# Patient Record
Sex: MALE | Race: WHITE | NOT HISPANIC OR LATINO | Employment: FULL TIME | URBAN - METROPOLITAN AREA
[De-identification: names, ages, dates, MRNs, and addresses within clinical notes are randomized per-mention and may not be internally consistent; named-entity substitution may affect disease eponyms.]

---

## 2017-01-04 ENCOUNTER — OFFICE VISIT (OUTPATIENT)
Dept: FAMILY MEDICINE CLINIC | Facility: CLINIC | Age: 22
End: 2017-01-04

## 2017-01-04 VITALS
WEIGHT: 163 LBS | DIASTOLIC BLOOD PRESSURE: 82 MMHG | HEART RATE: 97 BPM | SYSTOLIC BLOOD PRESSURE: 118 MMHG | HEIGHT: 71 IN | BODY MASS INDEX: 22.82 KG/M2 | OXYGEN SATURATION: 98 % | TEMPERATURE: 97.9 F

## 2017-01-04 DIAGNOSIS — L72.8 OTHER FOLLICULAR CYSTS OF THE SKIN AND SUBCUTANEOUS TISSUE: ICD-10-CM

## 2017-01-04 DIAGNOSIS — F17.210 CIGARETTE SMOKER: Primary | ICD-10-CM

## 2017-01-04 PROBLEM — M16.9 DEGENERATIVE ARTHRITIS OF HIP: Status: ACTIVE | Noted: 2017-01-04

## 2017-01-04 PROBLEM — R11.0 CHRONIC NAUSEA: Status: ACTIVE | Noted: 2017-01-04

## 2017-01-04 PROBLEM — L73.9 FOLLICULITIS: Status: ACTIVE | Noted: 2017-01-04

## 2017-01-04 PROBLEM — F07.81 BRAIN SYNDROME, POSTTRAUMATIC: Status: ACTIVE | Noted: 2017-01-04

## 2017-01-04 PROBLEM — K59.00 CN (CONSTIPATION): Status: ACTIVE | Noted: 2017-01-04

## 2017-01-04 PROBLEM — R59.9 ADENOPATHY: Status: ACTIVE | Noted: 2017-01-04

## 2017-01-04 PROBLEM — S06.0XAA CONCUSSION: Status: ACTIVE | Noted: 2017-01-04

## 2017-01-04 PROBLEM — F41.9 ANXIETY: Status: ACTIVE | Noted: 2017-01-04

## 2017-01-04 PROBLEM — J30.9 ALLERGIC RHINITIS: Status: ACTIVE | Noted: 2017-01-04

## 2017-01-04 PROBLEM — R00.2 AWARENESS OF HEARTBEATS: Status: ACTIVE | Noted: 2017-01-04

## 2017-01-04 PROBLEM — R10.9 ABDOMINAL PAIN: Status: ACTIVE | Noted: 2017-01-04

## 2017-01-04 PROBLEM — G89.21 CHRONIC PAIN DUE TO INJURY: Status: ACTIVE | Noted: 2017-01-04

## 2017-01-04 PROCEDURE — 99204 OFFICE O/P NEW MOD 45 MIN: CPT | Performed by: FAMILY MEDICINE

## 2017-01-04 RX ORDER — TRAZODONE HYDROCHLORIDE 50 MG/1
1-2 TABLET ORAL NIGHTLY
Refills: 0 | COMMUNITY
Start: 2016-12-14 | End: 2017-06-19

## 2017-01-04 RX ORDER — LANSOPRAZOLE 30 MG/1
1 CAPSULE, DELAYED RELEASE ORAL DAILY
Refills: 0 | COMMUNITY
Start: 2016-11-19 | End: 2017-06-19

## 2017-01-04 RX ORDER — DEXTROAMPHETAMINE SACCHARATE, AMPHETAMINE ASPARTATE, DEXTROAMPHETAMINE SULFATE AND AMPHETAMINE SULFATE 2.5; 2.5; 2.5; 2.5 MG/1; MG/1; MG/1; MG/1
20 TABLET ORAL 2 TIMES DAILY
Refills: 0 | COMMUNITY
Start: 2016-12-12 | End: 2017-06-20 | Stop reason: HOSPADM

## 2017-01-04 RX ORDER — HYDROCODONE BITARTRATE AND ACETAMINOPHEN 5; 325 MG/1; MG/1
1 TABLET ORAL 2 TIMES DAILY
Refills: 0 | COMMUNITY
Start: 2016-12-30 | End: 2017-06-19

## 2017-01-04 RX ORDER — VARENICLINE TARTRATE 1 MG/1
1 TABLET, FILM COATED ORAL 2 TIMES DAILY
Qty: 60 TABLET | Refills: 2 | OUTPATIENT
Start: 2017-01-04 | End: 2017-04-29

## 2017-01-04 NOTE — PROGRESS NOTES
Subjective   Mauro Epstein II is a 21 y.o. male who is here for   Chief Complaint   Patient presents with   • Establish Care     New pt here to [facility] for health maintenance. Formerly seen Dr. Holland with [facility]   • COPD     Went to [facility] due to cough and they informed him he had COPD. Wants to discuss.   • Bronchitis   • Rash   • Cough   .     History of Present Illness   Acute Bronchitis: Patient presents for presents evaluation of productive cough. Symptoms began several months ago and are gradually improving since that time.  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 s/w dr holland's office, pt was discharged due to no shows    Sees pain management for left hip post fracture arthritis pain.    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    Has cysts on his penis, checked for std multi times all neg,     Wants to quit smoking        The following portions of the patient's history were reviewed and updated as appropriate: allergies, current medications, past family history, past medical history, past social history, past surgical history and problem list.    Review of Systems    Objective   Physical Exam   Constitutional: He appears well-developed and well-nourished. He has a sickly appearance.   Appears tired or even hung over   HENT:   Mouth/Throat: Oropharynx is clear and moist.   Eyes: Right conjunctiva is injected. Left conjunctiva is injected.   Neck: Normal range of motion.   Cardiovascular: Normal rate.    Pulmonary/Chest: Effort normal.   Genitourinary: Circumcised. No penile tenderness.   Genitourinary Comments: Has 5 or so skin cysts on penile shaft   Neurological: He is alert.   Psychiatric: He has a normal mood and affect.   Nursing note and vitals reviewed.      Assessment/Plan   Mauro Chase was seen today for establish care, copd,  bronchitis, rash and cough.    Diagnoses and all orders for this visit:    Cigarette smoker  -     varenicline (CHANTIX STARTING MONTH PAK) 0.5 MG X 11 & 1 MG X 42 tablet; Take 0.5 mg one daily on days 1-2 and 0.5 mg twice daily on days 4-7. Then 1 mg twice daily for a total of 12 weeks.  -     varenicline (CHANTIX CONTINUING MONTH PAK) 1 MG tablet; Take 1 tablet by mouth 2 (Two) Times a Day.    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 To skin cysts  -     Ambulatory Referral to Plastic Surgery      There are no Patient Instructions on file for this visit.    Medications Discontinued During This Encounter   Medication Reason   • Ascorbic Acid (VITAMIN C PO) Therapy completed   • ondansetron (ZOFRAN) 4 MG tablet Therapy completed   • ondansetron (ZOFRAN) 4 MG tablet Therapy completed   • aspirin 325 MG tablet Therapy completed   • ALPRAZolam (XANAX) 0.25 MG tablet Therapy completed   • oxyCODONE-acetaminophen (PERCOCET) 5-325 MG per tablet Therapy completed        Return if symptoms worsen or fail to improve.    Dr. Gideon Foley  [place], [place]

## 2017-01-04 NOTE — MR AVS SNAPSHOT
Mauro Chase Amtala OMALLEY   1/4/2017 1:00 PM   Office Visit    Provider:  Gideon Foley MD   Department:  [facility]   Dept Phone:  [phone]                Your Full Care Plan              Today's Medication Changes          These changes are accurate as of: 1/4/17  1:50 PM.  If you have any questions, ask your nurse or doctor.               New Medication(s)Ordered:     * varenicline 0.5 MG X 11 & 1 MG X 42 tablet   Commonly known as:  CHANTIX STARTING MONTH TERRENCE   Take 0.5 mg one daily on days 1-2 and 0.5 mg twice daily on days 4-7. Then 1 mg twice daily for a total of 12 weeks.   Started by:  Gideon Foley MD       * varenicline 1 MG tablet   Commonly known as:  CHANTIX CONTINUING MONTH TERRENCE   Take 1 tablet by mouth 2 (Two) Times a Day.   Started by:  Gideon Foley MD       * Notice:  This list has 2 medication(s) that are the same as other medications prescribed for you. Read the directions carefully, and ask your doctor or other care provider to review them with you.      Stop taking medication(s)listed here:     ALPRAZolam 0.25 MG tablet   Commonly known as:  XANAX   Stopped by:  Gideon Foley MD           aspirin 325 MG tablet   Stopped by:  Gideon Foley MD           ondansetron 4 MG tablet   Commonly known as:  ZOFRAN   Stopped by:  Gideon Foley MD           PERCOCET 5-325 MG per tablet   Generic drug:  oxyCODONE-acetaminophen   Stopped by:  Gideon Foley MD           VITAMIN C PO   Stopped by:  Gideon Foley MD           ZOFRAN 4 MG tablet   Generic drug:  ondansetron   Stopped by:  Gideon Foley MD                Where to Get Your Medications      These medications were sent to [facility] [address] [place] [address] [address] [address] [address] - [phone] [place] [phone] [address] [address]     Phone:  [phone]     varenicline 0.5 MG X 11 & 1  MG X 42 tablet    varenicline 1 MG tablet                  Your Updated Medication List          This list is accurate as of: 17  1:50 PM.  Always use your most recent med list.                amphetamine-dextroamphetamine 10 MG tablet   Commonly known as:  ADDERALL       HYDROcodone-acetaminophen 5-325 MG per tablet   Commonly known as:  NORCO       lansoprazole 30 MG capsule   Commonly known as:  PREVACID       MULTIPLE VITAMINS PO       traZODone 50 MG tablet   Commonly known as:  DESYREL       * varenicline 0.5 MG X 11 & 1 MG X 42 tablet   Commonly known as:  CHANTIX STARTING MONTH TERRENCE   Take 0.5 mg one daily on days 1-2 and 0.5 mg twice daily on days 4-7. Then 1 mg twice daily for a total of 12 weeks.       * varenicline 1 MG tablet   Commonly known as:  CHANTIX CONTINUING MONTH TERRENCE   Take 1 tablet by mouth 2 (Two) Times a Day.       * Notice:  This list has 2 medication(s) that are the same as other medications prescribed for you. Read the directions carefully, and ask your doctor or other care provider to review them with you.            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 To sign up, go to [employer] and click on the Sign Up Now link in the New User? box. Enter your [email or web address] Activation Code exactly as it appears below along with the last four digits of your Social Security Number and your Date of Birth () to complete the sign-up process. If you do not sign up before the expiration date, you must request a new code.    [email or web address] Activation Code: [email or web address]  Expires: 2017   "1:50 PM    If you have questions, you can email [email or web address] or call [phone] to talk to our MyChart staff. Remember, [facility] is NOT to be used for urgent needs. For medical emergencies, dial 911.               Other Info from Your Visit           Allergies     Amoxicillin-pot Clavulanate      Cefaclor      Levofloxacin  Itching, Rash      Reason for Visit     Establish Care New pt here to [facility] care for health maintenance. Formerly seen Dr. Holland with [facility]    COPD Went to [facility] due to cough and they informed him he had COPD. Wants to discuss.      Vital Signs     Blood Pressure Pulse Temperature Height Weight Oxygen Saturation    118/82 97 97.9 °F (36.6 °C) 71\" (180.3 cm) 163 lb (73.9 kg) 98%    Body Mass Index Smoking Status                22.73 kg/m2 Current Every Day Smoker          Problems and Diagnoses Noted     Abdominal pain    Adenopathy    Nasal inflammation due to allergen    Anxiety problem    Hip pain    Awareness of heartbeats    Brain syndrome, posttraumatic    Chronic nausea    Chronic pain due to injury    Cigarette smoker    Constipation    Complications due to internal orthopedic device, implant, and graft    Concussion    Osteoarthritis (arthritis due to wear and tear of joints)    Folliculitis    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

## 2017-01-06 DIAGNOSIS — L72.8 OTHER FOLLICULAR CYSTS OF THE SKIN AND SUBCUTANEOUS TISSUE: Primary | ICD-10-CM

## 2017-04-13 ENCOUNTER — HOSPITAL ENCOUNTER (EMERGENCY)
Facility: HOSPITAL | Age: 22
Discharge: HOME | End: 2017-04-13
Attending: EMERGENCY MEDICINE | Admitting: EMERGENCY MEDICINE

## 2017-04-13 ENCOUNTER — APPOINTMENT (OUTPATIENT)
Dept: GENERAL RADIOLOGY | Facility: HOSPITAL | Age: 22
End: 2017-04-13

## 2017-04-13 VITALS
WEIGHT: 155 LBS | HEIGHT: 71 IN | RESPIRATION RATE: 14 BRPM | BODY MASS INDEX: 21.7 KG/M2 | SYSTOLIC BLOOD PRESSURE: 123 MMHG | OXYGEN SATURATION: 99 % | DIASTOLIC BLOOD PRESSURE: 85 MMHG | HEART RATE: 118 BPM | TEMPERATURE: 99.4 F

## 2017-04-13 DIAGNOSIS — S39.012A LUMBAR STRAIN, INITIAL ENCOUNTER: Primary | ICD-10-CM

## 2017-04-13 DIAGNOSIS — M54.16 LUMBAR RADICULOPATHY, ACUTE: ICD-10-CM

## 2017-04-13 DIAGNOSIS — M62.838 MUSCLE SPASM: ICD-10-CM

## 2017-04-13 PROCEDURE — 96372 THER/PROPH/DIAG INJ SC/IM: CPT

## 2017-04-13 PROCEDURE — 99282 EMERGENCY DEPT VISIT SF MDM: CPT

## 2017-04-13 PROCEDURE — 99284 EMERGENCY DEPT VISIT MOD MDM: CPT | Performed by: EMERGENCY MEDICINE

## 2017-04-13 PROCEDURE — 72100 X-RAY EXAM L-S SPINE 2/3 VWS: CPT

## 2017-04-13 PROCEDURE — 25010000002 HYDROMORPHONE PER 4 MG: Performed by: EMERGENCY MEDICINE

## 2017-04-13 RX ORDER — LORAZEPAM 0.5 MG/1
2 TABLET ORAL 2 TIMES DAILY
COMMUNITY
End: 2017-06-20 | Stop reason: HOSPADM

## 2017-04-13 RX ORDER — CYCLOBENZAPRINE HCL 10 MG
TABLET ORAL
Qty: 20 TABLET | Refills: 0 | Status: SHIPPED | OUTPATIENT
Start: 2017-04-13 | End: 2017-06-19

## 2017-04-13 RX ADMIN — HYDROMORPHONE HYDROCHLORIDE 1 MG: 1 INJECTION, SOLUTION INTRAMUSCULAR; INTRAVENOUS; SUBCUTANEOUS at 01:46

## 2017-04-13 NOTE — ED PROVIDER NOTES
Subjective   History of Present Illness  History of Present Illness    Chief complaint: Low back pain    Location: low back, bilateral paraspinal    Quality/Severity:  severe    Timing/Duration: abruptly worse since 2230 after a fall    Modifying Factors: worse c ROM and attempts at ambulation    Associated Symptoms: sensation change in bilateral legs and difficulty walking    Narrative: 20 yo male c hx of lumbar back pain for which he follows c pain specialist and has received injections previously presents to the ED following a mechanical fall in which he significantly aggravated his pain.     Review of Systems  No saddle anesthesia, no incontinence. O/W negative    Past Medical History:   Diagnosis Date   • ADD (attention deficit disorder)    • Anxiety    • Arthritis    • Chronic pain    • Headache    • Lumbar back pain    • Tobacco abuse counseling        Allergies   Allergen Reactions   • Amoxicillin-Pot Clavulanate    • Cefaclor    • Levofloxacin Itching and Rash       Past Surgical History:   Procedure Laterality Date   • FEMUR FRACTURE SURGERY  06/2009   • HIP SURGERY Left 10/2014   • HIP SURGERY Left 06/2009       Family History   Problem Relation Age of Onset   • Diabetes Mother    • Fibromyalgia Mother    • Heart disease Mother    • Hypertension Mother    • Cervical cancer Mother    • Hypertension Father    • No Known Problems Sister    • No Known Problems Brother    • No Known Problems Sister    • No Known Problems Sister    • No Known Problems Sister        Social History     Social History   • Marital status: Single     Spouse name: N/A   • Number of children: N/A   • Years of education: N/A     Occupational History   • Student    •       Social History Main Topics   • Smoking status: Current Every Day Smoker     Packs/day: 0.50     Types: Cigarettes   • Smokeless tobacco: Not on file   • Alcohol use Yes      Comment: Social   • Drug use: No   • Sexual activity: Yes     Partners: Male     Other  "Topics Concern   • Not on file     Social History Narrative   • No narrative on file       ED Triage Vitals   Temp Heart Rate Resp BP SpO2   04/13/17 0055 04/13/17 0055 04/13/17 0055 04/13/17 0055 04/13/17 0055   99.4 °F (37.4 °C) 118 14 123/85 99 %      Temp src Heart Rate Source Patient Position BP Location FiO2 (%)   -- -- 04/13/17 0055 04/13/17 0055 --     Lying Left arm      Objective   Physical Exam   Constitutional: He is oriented to person, place, and time. He appears well-developed.   Uncomfortable appearing   Cardiovascular: Normal rate and regular rhythm.    Pulmonary/Chest: Effort normal. No respiratory distress.   Abdominal: Soft. He exhibits no distension. There is no tenderness.   Musculoskeletal:        Back:    Neurological: He is alert and oriented to person, place, and time. He has normal strength.   Reflex Scores:       Patellar reflexes are 2+ on the right side and 2+ on the left side.       Achilles reflexes are 2+ on the right side and 2+ on the left side.  Pt notes sensory change to bilateral feet. \"I can feel but it feels different than before.\"   Skin: Skin is warm and dry.   Psychiatric: Judgment and thought content normal.   Vitals reviewed.      Procedures    RADIOLOGY        Study: XR L-spine    Findings: Lumbar straightening.  No fracture or subluxation    Interpreted Contemporaneously by myself, independently viewed by me           ED Course  ED Course   Comment By Time   Patient was much more comfortable after pain medication.  Reviewed x-ray results with the patient.  He is able to stand at bedside rock to heels and stand to tiptoes as well as stand 1 leg and then the other with normal gait and balance.  Normal strength is noted.  Recommend outpatient follow-up with neurosurgery.  Patient expresses understanding agreement with this plan. Christoph Ruiz MD 04/13 6300                  [facility]    Final diagnoses:   Lumbar strain, initial encounter   Muscle spasm   Lumbar radiculopathy, " acute              Medication List      New Prescriptions          cyclobenzaprine 10 MG tablet   Commonly known as:  FLEXERIL   Take one tablet by mouth up to 3 times daily as needed for muscle spasms                  Christoph Ruiz MD  04/13/17 5345

## 2017-04-14 ENCOUNTER — APPOINTMENT (OUTPATIENT)
Dept: MRI IMAGING | Facility: HOSPITAL | Age: 22
End: 2017-04-14

## 2017-04-14 ENCOUNTER — OFFICE VISIT (OUTPATIENT)
Dept: FAMILY MEDICINE CLINIC | Facility: CLINIC | Age: 22
End: 2017-04-14

## 2017-04-14 ENCOUNTER — HOSPITAL ENCOUNTER (EMERGENCY)
Facility: HOSPITAL | Age: 22
Discharge: HOME | End: 2017-04-14
Attending: EMERGENCY MEDICINE | Admitting: EMERGENCY MEDICINE

## 2017-04-14 VITALS
OXYGEN SATURATION: 99 % | HEIGHT: 71 IN | HEART RATE: 103 BPM | BODY MASS INDEX: 21 KG/M2 | SYSTOLIC BLOOD PRESSURE: 128 MMHG | WEIGHT: 150 LBS | DIASTOLIC BLOOD PRESSURE: 77 MMHG | RESPIRATION RATE: 16 BRPM | TEMPERATURE: 98.3 F

## 2017-04-14 VITALS
OXYGEN SATURATION: 98 % | DIASTOLIC BLOOD PRESSURE: 60 MMHG | HEIGHT: 71 IN | WEIGHT: 148 LBS | HEART RATE: 121 BPM | SYSTOLIC BLOOD PRESSURE: 100 MMHG | TEMPERATURE: 98.1 F | BODY MASS INDEX: 20.72 KG/M2

## 2017-04-14 DIAGNOSIS — M54.41 CHRONIC MIDLINE LOW BACK PAIN WITH BILATERAL SCIATICA: ICD-10-CM

## 2017-04-14 DIAGNOSIS — M54.42 CHRONIC MIDLINE LOW BACK PAIN WITH BILATERAL SCIATICA: ICD-10-CM

## 2017-04-14 DIAGNOSIS — M54.5 CHRONIC LOW BACK PAIN, UNSPECIFIED BACK PAIN LATERALITY, WITH SCIATICA PRESENCE UNSPECIFIED: Primary | ICD-10-CM

## 2017-04-14 DIAGNOSIS — G89.29 CHRONIC MIDLINE LOW BACK PAIN WITH BILATERAL SCIATICA: ICD-10-CM

## 2017-04-14 DIAGNOSIS — G89.29 CHRONIC LOW BACK PAIN, UNSPECIFIED BACK PAIN LATERALITY, WITH SCIATICA PRESENCE UNSPECIFIED: Primary | ICD-10-CM

## 2017-04-14 DIAGNOSIS — F17.210 CIGARETTE SMOKER: ICD-10-CM

## 2017-04-14 DIAGNOSIS — M51.36 DDD (DEGENERATIVE DISC DISEASE), LUMBAR: Primary | ICD-10-CM

## 2017-04-14 PROBLEM — M51.369 DDD (DEGENERATIVE DISC DISEASE), LUMBAR: Status: ACTIVE | Noted: 2017-04-14

## 2017-04-14 PROCEDURE — 99283 EMERGENCY DEPT VISIT LOW MDM: CPT

## 2017-04-14 PROCEDURE — 99284 EMERGENCY DEPT VISIT MOD MDM: CPT | Performed by: EMERGENCY MEDICINE

## 2017-04-14 PROCEDURE — 25010000002 ONDANSETRON PER 1 MG: Performed by: EMERGENCY MEDICINE

## 2017-04-14 PROCEDURE — 99213 OFFICE O/P EST LOW 20 MIN: CPT | Performed by: FAMILY MEDICINE

## 2017-04-14 PROCEDURE — 72148 MRI LUMBAR SPINE W/O DYE: CPT

## 2017-04-14 PROCEDURE — 96375 TX/PRO/DX INJ NEW DRUG ADDON: CPT

## 2017-04-14 PROCEDURE — 96374 THER/PROPH/DIAG INJ IV PUSH: CPT

## 2017-04-14 PROCEDURE — 25010000002 HYDROMORPHONE PER 4 MG: Performed by: EMERGENCY MEDICINE

## 2017-04-14 PROCEDURE — 96376 TX/PRO/DX INJ SAME DRUG ADON: CPT

## 2017-04-14 PROCEDURE — 25010000002 LORAZEPAM PER 2 MG: Performed by: EMERGENCY MEDICINE

## 2017-04-14 RX ORDER — BACLOFEN 20 MG/1
20 TABLET ORAL 3 TIMES DAILY PRN
Qty: 60 TABLET | Refills: 5 | Status: SHIPPED | OUTPATIENT
Start: 2017-04-14 | End: 2017-06-19

## 2017-04-14 RX ORDER — PROMETHAZINE HYDROCHLORIDE 25 MG/ML
25 INJECTION, SOLUTION INTRAMUSCULAR; INTRAVENOUS ONCE
Status: DISCONTINUED | OUTPATIENT
Start: 2017-04-14 | End: 2017-04-14

## 2017-04-14 RX ORDER — GABAPENTIN 800 MG/1
800 TABLET ORAL NIGHTLY PRN
Qty: 30 TABLET | Refills: 1 | Status: SHIPPED | OUTPATIENT
Start: 2017-04-14 | End: 2017-06-19

## 2017-04-14 RX ORDER — ONDANSETRON 2 MG/ML
4 INJECTION INTRAMUSCULAR; INTRAVENOUS ONCE
Status: COMPLETED | OUTPATIENT
Start: 2017-04-14 | End: 2017-04-14

## 2017-04-14 RX ORDER — LORAZEPAM 2 MG/ML
1 INJECTION INTRAMUSCULAR ONCE
Status: COMPLETED | OUTPATIENT
Start: 2017-04-14 | End: 2017-04-14

## 2017-04-14 RX ORDER — SODIUM CHLORIDE 0.9 % (FLUSH) 0.9 %
10 SYRINGE (ML) INJECTION AS NEEDED
Status: DISCONTINUED | OUTPATIENT
Start: 2017-04-14 | End: 2017-04-14 | Stop reason: HOSPADM

## 2017-04-14 RX ADMIN — HYDROMORPHONE HYDROCHLORIDE 1 MG: 1 INJECTION, SOLUTION INTRAMUSCULAR; INTRAVENOUS; SUBCUTANEOUS at 15:30

## 2017-04-14 RX ADMIN — LORAZEPAM 1 MG: 2 INJECTION INTRAMUSCULAR; INTRAVENOUS at 15:32

## 2017-04-14 RX ADMIN — HYDROMORPHONE HYDROCHLORIDE 1 MG: 1 INJECTION, SOLUTION INTRAMUSCULAR; INTRAVENOUS; SUBCUTANEOUS at 14:45

## 2017-04-14 RX ADMIN — ONDANSETRON 4 MG: 2 INJECTION, SOLUTION INTRAMUSCULAR; INTRAVENOUS at 14:45
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
## 2017-04-14 NOTE — ED NOTES
The patient is refusing to be discharged.  He is crying and expressing anger towards the ER for not being able to help him with his pain.  He wants to see a higher qualified ER MD.  Informed him that Dr Cyr is gone for the day.  He is on the telephone calling a family friend who is an  downtown.  I informed my director Lucrecia Michaels that the patient wishes to speak to someone higher than me.       Citlaly Mckinnon RN  04/14/17 6926

## 2017-04-14 NOTE — ED NOTES
There is an MRI ordered under Dr Foley from yesterday to do an MRI lumbar spine without contrast as an outpatient.  Dr Cyr wants to have the patient medicated and sent to MRI for the scan.  Spoke with MRI department and was informed that we can't do an outpatient test while the patient is in the ER.  She states that the order needs to be placed under the ER account number.       Citlaly Mckinnon RN  04/14/17 0216
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
## 2017-04-14 NOTE — ED NOTES
The patient verbalized understanding of discharge instructions, medications and follow up.  Ambulated from the ER with family.  No further needs at this time.       Citlaly Mckinnon RN  04/14/17 5766       Citlaly Mckinnon RN  04/14/17 2623

## 2017-04-15 NOTE — PROGRESS NOTES
Chief Complaint   Patient presents with   • Cough     x 2 days   • Back Pain       Low Back Pain: Patient complains of chronic low back pain. This is evaluated as a non injury. The patient first noted symptoms severalweeks ago. It was not related to no known injury. The pain is rated severe, intense, unremitting, and is located at the across the lower back. The pain is described as aching and occurs all day. The symptoms has been progressive. Symptoms are exacerbated by nothing in particular. Factors which relieve the pain include narcotic pain medications. Other associated symptoms include tingling in the right leg and tingling in the left leg. Previous history of symptoms: the problem is long-standing.   Treatment efforts have included rest, ice, OTC NSAIDS, prescription NSAIDS, acetaminophen, oral steroids and muscle relaxers, without relief.    Has chronic pain in hip from old fracture and removal of hardware  Sees pain management for years  receives injections and narcotics  He states this pain is worse and more in his lumbar vs hip/pelvis      He went to the ER a few days ago  We reviewed that note and looked at his xray's  Despite loss of lordosis his vertebrae and disc spaces look great.    No GI or  symptoms    Also lots of cough  Is a heavy smoker  Each cough makes his back hurt more      Objective   General:  Alert , seems to be in  distress  HEENT:  WNL  Heart: RR , no murmur  Vascular: good pulses in legs/feet  Lungs: clear  Back: mild decrease in ROM.    Neuro: Gait is normal, reflexes normal.  Skin: no rash.    INDICATION: Low back pain. Increasing severity.      FINDINGS: 3 views of the lumbar spine without comparison. No acute  fracture or subluxation. Vertebral body height is normal. There is mild  straightening of the normal thoracic lordosis. The sacroiliac joints are  normal.      IMPRESSION:  No acute findings.    Assessment/Plan   Mauro Chase was seen today for cough and back  pain.    Diagnoses and all orders for this visit:    DDD (degenerative disc disease), lumbar  -     MRI Lumbar Spine Without Contrast; Future  -     baclofen (LIORESAL) 20 MG tablet; Take 1 tablet by mouth 3 (Three) Times a Day As Needed for Muscle Spasms.  -     gabapentin (NEURONTIN) 800 MG tablet; Take 1 tablet by mouth At Night As Needed (pain).    Chronic midline low back pain with bilateral sciatica  -     gabapentin (NEURONTIN) 800 MG tablet; Take 1 tablet by mouth At Night As Needed (pain).    Cigarette smoker  -     PSE-Bromphen-Chlophedianol 30-2-12.5 MG/5ML liquid; Take 5 mL by mouth 4 (Four) Times a Day As Needed (cough).              There are no discontinued medications.     Patient Instructions   Will proceed on to lumbar MRI  Add gabapentin on qhs for pain and sleep    Cough medications.        We reviewed home treatments for back pain  No heavy lifting  Nightly back stretches  OTC medications: Tylenol, Advil, Aleve, IcyHot Patches  Hot soaks in tub.    Dr. Gideon Foley MD  [place], [place]  [facility].

## 2017-04-25 ENCOUNTER — TRANSCRIBE ORDERS (OUTPATIENT)
Dept: ADMINISTRATIVE | Facility: HOSPITAL | Age: 22
End: 2017-04-25

## 2017-04-25 DIAGNOSIS — Z13.9 SCREENING: Primary | ICD-10-CM

## 2017-04-28 ENCOUNTER — HOSPITAL ENCOUNTER (OUTPATIENT)
Dept: CARDIOLOGY | Facility: HOSPITAL | Age: 22
Discharge: HOME | End: 2017-04-28

## 2017-04-28 VITALS
BODY MASS INDEX: 21 KG/M2 | HEIGHT: 71 IN | WEIGHT: 150 LBS | HEART RATE: 98 BPM | SYSTOLIC BLOOD PRESSURE: 116 MMHG | DIASTOLIC BLOOD PRESSURE: 67 MMHG | OXYGEN SATURATION: 99 % | RESPIRATION RATE: 18 BRPM

## 2017-04-28 DIAGNOSIS — Z13.9 SCREENING: ICD-10-CM

## 2017-04-28 LAB
BH CV STRESS BP STAGE 1: NORMAL
BH CV STRESS BP STAGE 2: NORMAL
BH CV STRESS BP STAGE 3: NORMAL
BH CV STRESS DURATION MIN STAGE 1: 3
BH CV STRESS DURATION MIN STAGE 2: 3
BH CV STRESS DURATION MIN STAGE 3: 3
BH CV STRESS DURATION SEC STAGE 1: 0
BH CV STRESS DURATION SEC STAGE 2: 0
BH CV STRESS DURATION SEC STAGE 3: 0
BH CV STRESS GRADE STAGE 1: 10
BH CV STRESS GRADE STAGE 2: 12
BH CV STRESS GRADE STAGE 3: 14
BH CV STRESS HR STAGE 1: 125
BH CV STRESS HR STAGE 2: 150
BH CV STRESS HR STAGE 3: 171
BH CV STRESS METS STAGE 1: 5
BH CV STRESS METS STAGE 2: 7.5
BH CV STRESS METS STAGE 3: 10
BH CV STRESS PROTOCOL 1: NORMAL
BH CV STRESS RECOVERY BP: NORMAL MMHG
BH CV STRESS RECOVERY HR: 97 BPM
BH CV STRESS SPEED STAGE 1: 1.7
BH CV STRESS SPEED STAGE 2: 2.5
BH CV STRESS SPEED STAGE 3: 3.4
BH CV STRESS STAGE 1: 1
BH CV STRESS STAGE 2: 2
BH CV STRESS STAGE 3: 3
MAXIMAL PREDICTED HEART RATE: 197 BPM
PERCENT MAX PREDICTED HR: 86.8 %
STRESS BASELINE BP: NORMAL MMHG
STRESS BASELINE HR: 98 BPM
STRESS O2 SAT REST: 99 %
STRESS PERCENT HR: 102 %
STRESS POST ESTIMATED WORKLOAD: 10.2 METS
STRESS POST EXERCISE DUR MIN: 9 MIN
STRESS POST EXERCISE DUR SEC: 1 SEC
STRESS POST PEAK BP: NORMAL MMHG
STRESS POST PEAK HR: 171 BPM
STRESS TARGET HR: 167 BPM

## 2017-04-28 PROCEDURE — 93018 CV STRESS TEST I&R ONLY: CPT | Performed by: INTERNAL MEDICINE

## 2017-04-28 PROCEDURE — 93016 CV STRESS TEST SUPVJ ONLY: CPT | Performed by: INTERNAL MEDICINE

## 2017-04-28 PROCEDURE — 93017 CV STRESS TEST TRACING ONLY: CPT

## 2017-05-05 ENCOUNTER — TELEPHONE (OUTPATIENT)
Dept: NEUROSURGERY | Facility: CLINIC | Age: 22
End: 2017-05-05

## 2017-06-19 ENCOUNTER — HOSPITAL ENCOUNTER (OUTPATIENT)
Facility: HOSPITAL | Age: 22
Setting detail: OBSERVATION
Discharge: HOME | End: 2017-06-20
Attending: EMERGENCY MEDICINE | Admitting: HOSPITALIST

## 2017-06-19 ENCOUNTER — APPOINTMENT (OUTPATIENT)
Dept: CT IMAGING | Facility: HOSPITAL | Age: 22
End: 2017-06-19

## 2017-06-19 DIAGNOSIS — R10.31 RIGHT LOWER QUADRANT ABDOMINAL PAIN: Primary | ICD-10-CM

## 2017-06-19 DIAGNOSIS — R11.2 NON-INTRACTABLE VOMITING WITH NAUSEA, UNSPECIFIED VOMITING TYPE: ICD-10-CM

## 2017-06-19 LAB
ALBUMIN SERPL-MCNC: 4.7 G/DL (ref 3.5–5.2)
ALBUMIN/GLOB SERPL: 2 G/DL
ALP SERPL-CCNC: 82 U/L (ref 40–129)
ALT SERPL W P-5'-P-CCNC: 10 U/L (ref 5–41)
ANION GAP SERPL CALCULATED.3IONS-SCNC: 15.8 MMOL/L
ANION GAP SERPL CALCULATED.3IONS-SCNC: 9.9 MMOL/L
AST SERPL-CCNC: 14 U/L (ref 5–40)
BASOPHILS # BLD AUTO: 0.06 10*3/MM3 (ref 0–0.2)
BASOPHILS # BLD AUTO: 0.09 10*3/MM3 (ref 0–0.2)
BASOPHILS NFR BLD AUTO: 0.5 % (ref 0–2)
BASOPHILS NFR BLD AUTO: 0.5 % (ref 0–2)
BILIRUB SERPL-MCNC: 0.4 MG/DL (ref 0.2–1.2)
BILIRUB UR QL STRIP: NEGATIVE
BUN BLD-MCNC: 11 MG/DL (ref 6–20)
BUN BLD-MCNC: 8 MG/DL (ref 6–20)
BUN/CREAT SERPL: 11.1 (ref 7–25)
BUN/CREAT SERPL: 16.7 (ref 7–25)
CALCIUM SPEC-SCNC: 8.4 MG/DL (ref 8.6–10.5)
CALCIUM SPEC-SCNC: 9.4 MG/DL (ref 8.6–10.5)
CHLORIDE SERPL-SCNC: 103 MMOL/L (ref 98–107)
CHLORIDE SERPL-SCNC: 104 MMOL/L (ref 98–107)
CLARITY UR: CLEAR
CO2 SERPL-SCNC: 23.2 MMOL/L (ref 22–29)
CO2 SERPL-SCNC: 26.1 MMOL/L (ref 22–29)
COLOR UR: YELLOW
CREAT BLD-MCNC: 0.66 MG/DL (ref 0.76–1.27)
CREAT BLD-MCNC: 0.72 MG/DL (ref 0.76–1.27)
DEPRECATED RDW RBC AUTO: 43.1 FL (ref 37–54)
DEPRECATED RDW RBC AUTO: 43.4 FL (ref 37–54)
EOSINOPHIL # BLD AUTO: 0.45 10*3/MM3 (ref 0.1–0.3)
EOSINOPHIL # BLD AUTO: 0.51 10*3/MM3 (ref 0.1–0.3)
EOSINOPHIL NFR BLD AUTO: 2.6 % (ref 0–4)
EOSINOPHIL NFR BLD AUTO: 3.9 % (ref 0–4)
ERYTHROCYTE [DISTWIDTH] IN BLOOD BY AUTOMATED COUNT: 13.1 % (ref 11.5–14.5)
ERYTHROCYTE [DISTWIDTH] IN BLOOD BY AUTOMATED COUNT: 13.1 % (ref 11.5–14.5)
GFR SERPL CREATININE-BSD FRML MDRD: 134 ML/MIN/1.73
GFR SERPL CREATININE-BSD FRML MDRD: 148 ML/MIN/1.73
GLOBULIN UR ELPH-MCNC: 2.4 GM/DL
GLUCOSE BLD-MCNC: 88 MG/DL (ref 65–99)
GLUCOSE BLD-MCNC: 92 MG/DL (ref 65–99)
GLUCOSE UR STRIP-MCNC: NEGATIVE MG/DL
HCT VFR BLD AUTO: 35.7 % (ref 42–52)
HCT VFR BLD AUTO: 40.5 % (ref 42–52)
HGB BLD-MCNC: 12 G/DL (ref 14–18)
HGB BLD-MCNC: 13.6 G/DL (ref 14–18)
HGB UR QL STRIP.AUTO: NEGATIVE
IMM GRANULOCYTES # BLD: 0.03 10*3/MM3 (ref 0–0.03)
IMM GRANULOCYTES # BLD: 0.06 10*3/MM3 (ref 0–0.03)
IMM GRANULOCYTES NFR BLD: 0.2 % (ref 0–0.5)
IMM GRANULOCYTES NFR BLD: 0.4 % (ref 0–0.5)
KETONES UR QL STRIP: NEGATIVE
LEUKOCYTE ESTERASE UR QL STRIP.AUTO: NEGATIVE
LIPASE SERPL-CCNC: 14 U/L (ref 13–60)
LYMPHOCYTES # BLD AUTO: 4.4 10*3/MM3 (ref 0.6–4.8)
LYMPHOCYTES # BLD AUTO: 5.85 10*3/MM3 (ref 0.6–4.8)
LYMPHOCYTES NFR BLD AUTO: 25.8 % (ref 20–45)
LYMPHOCYTES NFR BLD AUTO: 44.7 % (ref 20–45)
MCH RBC QN AUTO: 30 PG (ref 27–31)
MCH RBC QN AUTO: 30.3 PG (ref 27–31)
MCHC RBC AUTO-ENTMCNC: 33.6 G/DL (ref 31–37)
MCHC RBC AUTO-ENTMCNC: 33.6 G/DL (ref 31–37)
MCV RBC AUTO: 89.2 FL (ref 80–94)
MCV RBC AUTO: 90.2 FL (ref 80–94)
MONOCYTES # BLD AUTO: 0.91 10*3/MM3 (ref 0–1)
MONOCYTES # BLD AUTO: 1.48 10*3/MM3 (ref 0–1)
MONOCYTES NFR BLD AUTO: 7 % (ref 3–8)
MONOCYTES NFR BLD AUTO: 8.7 % (ref 3–8)
NEUTROPHILS # BLD AUTO: 10.55 10*3/MM3 (ref 1.5–8.3)
NEUTROPHILS # BLD AUTO: 5.73 10*3/MM3 (ref 1.5–8.3)
NEUTROPHILS NFR BLD AUTO: 43.7 % (ref 45–70)
NEUTROPHILS NFR BLD AUTO: 62 % (ref 45–70)
NITRITE UR QL STRIP: NEGATIVE
NRBC BLD MANUAL-RTO: 0 /100 WBC (ref 0–0)
NRBC BLD MANUAL-RTO: 0 /100 WBC (ref 0–0)
PH UR STRIP.AUTO: 7 [PH] (ref 4.5–8)
PLATELET # BLD AUTO: 421 10*3/MM3 (ref 140–500)
PLATELET # BLD AUTO: 506 10*3/MM3 (ref 140–500)
PMV BLD AUTO: 9.5 FL (ref 7.4–10.4)
PMV BLD AUTO: 9.6 FL (ref 7.4–10.4)
POTASSIUM BLD-SCNC: 3.6 MMOL/L (ref 3.5–5.2)
POTASSIUM BLD-SCNC: 3.9 MMOL/L (ref 3.5–5.2)
PROT SERPL-MCNC: 7.1 G/DL (ref 6–8.5)
PROT UR QL STRIP: NEGATIVE
RBC # BLD AUTO: 3.96 10*6/MM3 (ref 4.7–6.1)
RBC # BLD AUTO: 4.54 10*6/MM3 (ref 4.7–6.1)
SODIUM BLD-SCNC: 140 MMOL/L (ref 136–145)
SODIUM BLD-SCNC: 142 MMOL/L (ref 136–145)
SP GR UR STRIP: 1.01 (ref 1–1.03)
UROBILINOGEN UR QL STRIP: NORMAL
WBC NRBC COR # BLD: 13.09 10*3/MM3 (ref 4.8–10.8)
WBC NRBC COR # BLD: 17.03 10*3/MM3 (ref 4.8–10.8)

## 2017-06-19 PROCEDURE — 80053 COMPREHEN METABOLIC PANEL: CPT | Performed by: EMERGENCY MEDICINE

## 2017-06-19 PROCEDURE — 85025 COMPLETE CBC W/AUTO DIFF WBC: CPT | Performed by: HOSPITALIST

## 2017-06-19 PROCEDURE — G0378 HOSPITAL OBSERVATION PER HR: HCPCS

## 2017-06-19 PROCEDURE — 99284 EMERGENCY DEPT VISIT MOD MDM: CPT

## 2017-06-19 PROCEDURE — 25010000002 ONDANSETRON PER 1 MG: Performed by: HOSPITALIST

## 2017-06-19 PROCEDURE — 25010000002 PROMETHAZINE PER 50 MG

## 2017-06-19 PROCEDURE — 96361 HYDRATE IV INFUSION ADD-ON: CPT

## 2017-06-19 PROCEDURE — 25010000002 HYDROMORPHONE PER 4 MG: Performed by: EMERGENCY MEDICINE

## 2017-06-19 PROCEDURE — 0 IOPAMIDOL PER 1 ML: Performed by: EMERGENCY MEDICINE

## 2017-06-19 PROCEDURE — 25010000002 MORPHINE PER 10 MG: Performed by: EMERGENCY MEDICINE

## 2017-06-19 PROCEDURE — 80048 BASIC METABOLIC PNL TOTAL CA: CPT | Performed by: HOSPITALIST

## 2017-06-19 PROCEDURE — 96375 TX/PRO/DX INJ NEW DRUG ADDON: CPT

## 2017-06-19 PROCEDURE — 96376 TX/PRO/DX INJ SAME DRUG ADON: CPT

## 2017-06-19 PROCEDURE — 96374 THER/PROPH/DIAG INJ IV PUSH: CPT

## 2017-06-19 PROCEDURE — 81003 URINALYSIS AUTO W/O SCOPE: CPT | Performed by: EMERGENCY MEDICINE

## 2017-06-19 PROCEDURE — 25010000002 HYDROMORPHONE PER 4 MG: Performed by: HOSPITALIST

## 2017-06-19 PROCEDURE — 25010000002 ONDANSETRON PER 1 MG: Performed by: EMERGENCY MEDICINE

## 2017-06-19 PROCEDURE — 85025 COMPLETE CBC W/AUTO DIFF WBC: CPT | Performed by: EMERGENCY MEDICINE

## 2017-06-19 PROCEDURE — 74177 CT ABD & PELVIS W/CONTRAST: CPT

## 2017-06-19 PROCEDURE — 99284 EMERGENCY DEPT VISIT MOD MDM: CPT | Performed by: EMERGENCY MEDICINE

## 2017-06-19 PROCEDURE — 83690 ASSAY OF LIPASE: CPT | Performed by: EMERGENCY MEDICINE

## 2017-06-19 PROCEDURE — 99242 OFF/OP CONSLTJ NEW/EST SF 20: CPT | Performed by: SURGERY

## 2017-06-19 PROCEDURE — 99219 PR INITIAL OBSERVATION CARE/DAY 50 MINUTES: CPT | Performed by: HOSPITALIST

## 2017-06-19 RX ORDER — TRAMADOL HYDROCHLORIDE 50 MG/1
50 TABLET ORAL EVERY 6 HOURS PRN
Status: DISCONTINUED | OUTPATIENT
Start: 2017-06-19 | End: 2017-06-20 | Stop reason: HOSPADM

## 2017-06-19 RX ORDER — PANTOPRAZOLE SODIUM 40 MG/10ML
40 INJECTION, POWDER, LYOPHILIZED, FOR SOLUTION INTRAVENOUS
Status: DISCONTINUED | OUTPATIENT
Start: 2017-06-19 | End: 2017-06-20 | Stop reason: HOSPADM

## 2017-06-19 RX ORDER — LORAZEPAM 2 MG/1
2 TABLET ORAL 2 TIMES DAILY
COMMUNITY
End: 2018-09-13

## 2017-06-19 RX ORDER — NICOTINE 21 MG/24HR
1 PATCH, TRANSDERMAL 24 HOURS TRANSDERMAL EVERY 24 HOURS
Status: DISCONTINUED | OUTPATIENT
Start: 2017-06-19 | End: 2017-06-20 | Stop reason: HOSPADM

## 2017-06-19 RX ORDER — DEXTROAMPHETAMINE SACCHARATE, AMPHETAMINE ASPARTATE, DEXTROAMPHETAMINE SULFATE AND AMPHETAMINE SULFATE 2.5; 2.5; 2.5; 2.5 MG/1; MG/1; MG/1; MG/1
20 TABLET ORAL 2 TIMES DAILY
Status: DISCONTINUED | OUTPATIENT
Start: 2017-06-20 | End: 2017-06-20 | Stop reason: HOSPADM

## 2017-06-19 RX ORDER — ONDANSETRON 2 MG/ML
4 INJECTION INTRAMUSCULAR; INTRAVENOUS ONCE
Status: COMPLETED | OUTPATIENT
Start: 2017-06-19 | End: 2017-06-19

## 2017-06-19 RX ORDER — SODIUM CHLORIDE 0.9 % (FLUSH) 0.9 %
1-10 SYRINGE (ML) INJECTION AS NEEDED
Status: DISCONTINUED | OUTPATIENT
Start: 2017-06-19 | End: 2017-06-20 | Stop reason: HOSPADM

## 2017-06-19 RX ORDER — ONDANSETRON 2 MG/ML
4 INJECTION INTRAMUSCULAR; INTRAVENOUS EVERY 4 HOURS PRN
Status: DISCONTINUED | OUTPATIENT
Start: 2017-06-19 | End: 2017-06-20 | Stop reason: HOSPADM

## 2017-06-19 RX ORDER — PROMETHAZINE HYDROCHLORIDE 25 MG/ML
INJECTION, SOLUTION INTRAMUSCULAR; INTRAVENOUS
Status: COMPLETED
Start: 2017-06-19 | End: 2017-06-19

## 2017-06-19 RX ORDER — TRAMADOL HYDROCHLORIDE 50 MG/1
TABLET ORAL
Status: COMPLETED
Start: 2017-06-19 | End: 2017-06-19

## 2017-06-19 RX ORDER — LORAZEPAM 1 MG/1
2 TABLET ORAL EVERY 12 HOURS SCHEDULED
Status: DISCONTINUED | OUTPATIENT
Start: 2017-06-19 | End: 2017-06-20 | Stop reason: HOSPADM

## 2017-06-19 RX ORDER — CHLORDIAZEPOXIDE HYDROCHLORIDE AND CLIDINIUM BROMIDE 5; 2.5 MG/1; MG/1
1 CAPSULE ORAL
Status: DISCONTINUED | OUTPATIENT
Start: 2017-06-19 | End: 2017-06-20 | Stop reason: HOSPADM

## 2017-06-19 RX ORDER — SODIUM CHLORIDE 9 MG/ML
1000 INJECTION, SOLUTION INTRAVENOUS ONCE
Status: COMPLETED | OUTPATIENT
Start: 2017-06-19 | End: 2017-06-19

## 2017-06-19 RX ORDER — SODIUM CHLORIDE 9 MG/ML
100 INJECTION, SOLUTION INTRAVENOUS CONTINUOUS
Status: DISCONTINUED | OUTPATIENT
Start: 2017-06-19 | End: 2017-06-20

## 2017-06-19 RX ORDER — DEXTROAMPHETAMINE SACCHARATE, AMPHETAMINE ASPARTATE, DEXTROAMPHETAMINE SULFATE AND AMPHETAMINE SULFATE 5; 5; 5; 5 MG/1; MG/1; MG/1; MG/1
20 TABLET ORAL DAILY
COMMUNITY
End: 2018-09-21

## 2017-06-19 RX ORDER — LORAZEPAM 0.5 MG/1
0.5 TABLET ORAL EVERY 12 HOURS SCHEDULED
Status: DISCONTINUED | OUTPATIENT
Start: 2017-06-19 | End: 2017-06-19

## 2017-06-19 RX ORDER — MAGNESIUM CARB/ALUMINUM HYDROX 105-160MG
300 TABLET,CHEWABLE ORAL ONCE
Status: COMPLETED | OUTPATIENT
Start: 2017-06-19 | End: 2017-06-19

## 2017-06-19 RX ORDER — PROMETHAZINE HYDROCHLORIDE 25 MG/ML
12.5 INJECTION, SOLUTION INTRAMUSCULAR; INTRAVENOUS ONCE
Status: COMPLETED | OUTPATIENT
Start: 2017-06-19 | End: 2017-06-19

## 2017-06-19 RX ORDER — DEXTROAMPHETAMINE SACCHARATE, AMPHETAMINE ASPARTATE, DEXTROAMPHETAMINE SULFATE AND AMPHETAMINE SULFATE 2.5; 2.5; 2.5; 2.5 MG/1; MG/1; MG/1; MG/1
10 TABLET ORAL 2 TIMES DAILY
Status: DISCONTINUED | OUTPATIENT
Start: 2017-06-19 | End: 2017-06-19

## 2017-06-19 RX ADMIN — NICOTINE 1 PATCH: 21 PATCH TRANSDERMAL at 09:28

## 2017-06-19 RX ADMIN — Medication 10 ML: at 10:02

## 2017-06-19 RX ADMIN — TRAMADOL HYDROCHLORIDE 50 MG: 50 TABLET, FILM COATED ORAL at 21:29

## 2017-06-19 RX ADMIN — SODIUM CHLORIDE 1000 ML: 9 INJECTION, SOLUTION INTRAVENOUS at 00:50

## 2017-06-19 RX ADMIN — HYDROMORPHONE HYDROCHLORIDE 1 MG: 1 INJECTION, SOLUTION INTRAMUSCULAR; INTRAVENOUS; SUBCUTANEOUS at 02:21

## 2017-06-19 RX ADMIN — LORAZEPAM 0.5 MG: 0.5 TABLET ORAL at 13:58

## 2017-06-19 RX ADMIN — IOPAMIDOL 100 ML: 755 INJECTION, SOLUTION INTRAVENOUS at 03:04

## 2017-06-19 RX ADMIN — CHLORDIAZEPOXIDE HYDROCHLORIDE AND CLIDINIUM BROMIDE 1 CAPSULE: 5; 2.5 CAPSULE ORAL at 12:42

## 2017-06-19 RX ADMIN — HYDROMORPHONE HYDROCHLORIDE 1 MG: 1 INJECTION, SOLUTION INTRAMUSCULAR; INTRAVENOUS; SUBCUTANEOUS at 07:52

## 2017-06-19 RX ADMIN — ONDANSETRON 4 MG: 2 INJECTION, SOLUTION INTRAMUSCULAR; INTRAVENOUS at 06:37

## 2017-06-19 RX ADMIN — PROMETHAZINE HYDROCHLORIDE 12.5 MG: 25 INJECTION, SOLUTION INTRAMUSCULAR; INTRAVENOUS at 03:22

## 2017-06-19 RX ADMIN — HYDROMORPHONE HYDROCHLORIDE 1 MG: 1 INJECTION, SOLUTION INTRAMUSCULAR; INTRAVENOUS; SUBCUTANEOUS at 01:16

## 2017-06-19 RX ADMIN — CHLORDIAZEPOXIDE HYDROCHLORIDE AND CLIDINIUM BROMIDE 1 CAPSULE: 5; 2.5 CAPSULE ORAL at 17:32

## 2017-06-19 RX ADMIN — HYDROMORPHONE HYDROCHLORIDE 1 MG: 1 INJECTION, SOLUTION INTRAMUSCULAR; INTRAVENOUS; SUBCUTANEOUS at 03:52

## 2017-06-19 RX ADMIN — HYDROMORPHONE HYDROCHLORIDE 1 MG: 1 INJECTION, SOLUTION INTRAMUSCULAR; INTRAVENOUS; SUBCUTANEOUS at 05:41

## 2017-06-19 RX ADMIN — PROMETHAZINE HYDROCHLORIDE 12.5 MG: 25 INJECTION INTRAMUSCULAR; INTRAVENOUS at 03:22

## 2017-06-19 RX ADMIN — ONDANSETRON 4 MG: 2 INJECTION, SOLUTION INTRAMUSCULAR; INTRAVENOUS at 17:32

## 2017-06-19 RX ADMIN — Medication 10 ML: at 17:33

## 2017-06-19 RX ADMIN — PANTOPRAZOLE SODIUM 40 MG: 40 INJECTION, POWDER, FOR SOLUTION INTRAVENOUS at 06:36

## 2017-06-19 RX ADMIN — MAGESIUM CITRATE 300 ML: 1.75 LIQUID ORAL at 12:42

## 2017-06-19 RX ADMIN — LORAZEPAM 2 MG: 1 TABLET ORAL at 20:14

## 2017-06-19 RX ADMIN — SODIUM CHLORIDE 100 ML/HR: 9 INJECTION, SOLUTION INTRAVENOUS at 16:52

## 2017-06-19 RX ADMIN — ONDANSETRON 4 MG: 2 INJECTION, SOLUTION INTRAMUSCULAR; INTRAVENOUS at 13:23

## 2017-06-19 RX ADMIN — SODIUM CHLORIDE 100 ML/HR: 9 INJECTION, SOLUTION INTRAVENOUS at 05:48

## 2017-06-19 RX ADMIN — CHLORDIAZEPOXIDE HYDROCHLORIDE AND CLIDINIUM BROMIDE 1 CAPSULE: 5; 2.5 CAPSULE ORAL at 20:15

## 2017-06-19 RX ADMIN — MORPHINE SULFATE 4 MG: 4 INJECTION, SOLUTION INTRAMUSCULAR; INTRAVENOUS at 00:51

## 2017-06-19 RX ADMIN — ONDANSETRON 4 MG: 2 INJECTION, SOLUTION INTRAMUSCULAR; INTRAVENOUS at 00:50

## 2017-06-19 RX ADMIN — HYDROMORPHONE HYDROCHLORIDE 1 MG: 1 INJECTION, SOLUTION INTRAMUSCULAR; INTRAVENOUS; SUBCUTANEOUS at 10:01

## 2017-06-19 RX ADMIN — ONDANSETRON 4 MG: 2 INJECTION, SOLUTION INTRAMUSCULAR; INTRAVENOUS at 02:20

## 2017-06-19 RX ADMIN — DEXTROAMPHETAMINE SACCHARATE, AMPHETAMINE ASPARTATE, DEXTROAMPHETAMINE SULFATE AND AMPHETAMINE SULFATE 10 MG: 2.5; 2.5; 2.5; 2.5 TABLET ORAL at 14:14

## 2017-06-19 NOTE — NURSING NOTE
Pt home medications reviewed with patient and friend at bedside due to patient having extreme anxiety and emotional outburst. Discussed case with dr silverio and was decided to start/resume home po meds to assist pt with emotional instability and anxiety. meds updated on admission med rec as original doses recorded were not accurate to med bottle dosage pt supplied to this RN.

## 2017-06-19 NOTE — PLAN OF CARE
Problem: Patient Care Overview (Adult)  Goal: Adult Individualization and Mutuality  Outcome: Ongoing (interventions implemented as appropriate)    06/19/17 1529   Individualization   Patient Specific Preferences nneds emotional support, empathy , encouargement    Patient Specific Goals free of pain    Patient Specific Interventions pain control, medications from home to be restarted    Mutuality/Individual Preferences   What Anxieties, Fears or Concerns Do You Have About Your Health or Care? pain control , why is WBC high    What Questions Do You Have About Your Health or Care? want to know why i am in pain        Goal: Discharge Needs Assessment  Outcome: Ongoing (interventions implemented as appropriate)    Problem: Fall Risk (Adult)  Goal: Identify Related Risk Factors and Signs and Symptoms  Outcome: Outcome(s) achieved Date Met:  06/19/17  Goal: Absence of Falls  Outcome: Ongoing (interventions implemented as appropriate)    Problem: Pain, Acute (Adult)  Goal: Identify Related Risk Factors and Signs and Symptoms  Outcome: Ongoing (interventions implemented as appropriate)    06/19/17 1529   Pain, Acute   Related Risk Factors (Acute Pain) fear;patient perception;psychosocial factor;positioning;persistent pain   Signs and Symptoms (Acute Pain) constipation/diarrhea;facial mask of pain/grimace;fatigue/weakness;moaning;impaired thought process/concentration;guarding/abnormal posturing/positioning;nausea/vomiting/anorexia;pacing/restlessness;sleep pattern alteration;verbalization of pain descriptors  (patient expressed increased pain perception and anxiety )       Goal: Acceptable Pain Control/Comfort Level  Outcome: Ongoing (interventions implemented as appropriate)    06/19/17 1529   Pain, Acute (Adult)   Acceptable Pain Control/Comfort Level making progress toward outcome  (anti anxiety meds administered for better emotional stabilit)         Problem: Pain, Chronic (Adult)  Goal: Identify Related Risk Factors  and Signs and Symptoms  Outcome: Ongoing (interventions implemented as appropriate)    06/19/17 1529   Pain, Chronic   Related Risk Factors (Chronic Pain) coping ineffective;pain control inadequate;psychosocial factor   Signs and Symptoms (Chronic Pain) fatigue/weakness;guarding behavior/splinting/limp;impaired thought process/concentration;self-focusing;pacing/restlessness;verbalization of pain descriptors;verbalization of pain/discomfort for a prolonged time period  (pain uncontrolled anxiety increased , unable to find source )       Goal: Acceptable Pain Control/Comfort Level  Outcome: Ongoing (interventions implemented as appropriate)

## 2017-06-19 NOTE — PLAN OF CARE
Problem: Patient Care Overview (Adult)  Goal: Plan of Care Review  Outcome: Ongoing (interventions implemented as appropriate)    06/19/17 0623   Coping/Psychosocial Response Interventions   Plan Of Care Reviewed With patient;mother   Patient Care Overview   Progress no change   Outcome Evaluation   Outcome Summary/Follow up Plan Pt admitted for observation for RLQ pain, Dr. Sanchez consulted, vitals are stable, Dilaudid 1md q2 prn for pain control, patient interested in information on smoking cessation, npo pending Dr. Sanchez's exam and evaluation       Goal: Adult Individualization and Mutuality  Outcome: Ongoing (interventions implemented as appropriate)  Goal: Discharge Needs Assessment  Outcome: Ongoing (interventions implemented as appropriate)    Problem: Fall Risk (Adult)  Goal: Identify Related Risk Factors and Signs and Symptoms  Outcome: Ongoing (interventions implemented as appropriate)  Goal: Absence of Falls  Outcome: Ongoing (interventions implemented as appropriate)    Problem: Pain, Acute (Adult)  Goal: Identify Related Risk Factors and Signs and Symptoms  Outcome: Ongoing (interventions implemented as appropriate)  Goal: Acceptable Pain Control/Comfort Level  Outcome: Ongoing (interventions implemented as appropriate)    Problem: Pain, Chronic (Adult)  Goal: Identify Related Risk Factors and Signs and Symptoms  Outcome: Ongoing (interventions implemented as appropriate)  Goal: Acceptable Pain Control/Comfort Level  Outcome: Ongoing (interventions implemented as appropriate)

## 2017-06-19 NOTE — CONSULTS
General Surgery      Patient Care Team:  Gideon Foley MD as PCP - General (Family Medicine)  Haseeb Hamlin MD as Consulting Physician (Urology)  Mingo Plata MD as Consulting Physician (Pain Medicine)    CHIEF COMPLAINT: Abdominal pain    HISTORY OF PRESENT ILLNESS: Patient is a very pleasant 22-year-old  male who presented to [facility] emergency room last night with acute onset of right sided abdominal pain.  He reports his pain started at 4 PM yesterday afternoon it was acute in onset, sharp stabbing in nature and worsened with any movement.  Followed then by 2 episodes of diarrhea and nausea and vomiting.  He reports he thought he was hungry status lysis pizza which only worsened his pain.  He denies any recent travel, sick contacts.  Denies any history of trauma.  Denies any melena hematochezia or urinary symptoms.  He denies fevers but reports chills.  He reports his appetite has been poor.  Workup was done by the emergency room physician including labs and a CT scan with oral and IV contrast.  I have reviewed the images personally and discussed with the radiologist on-call Dr. Merida.  Appendix is normal.  There is some diffuse thickening in the terminal ileum with equalization concerning for likely ileitis.  He has been admitted for observation with consultation to gastroenterology and general surgery.    He has a history of chronic pain secondary to multiple orthopedic left hip surgery status post trauma.        Past Medical History:   Diagnosis Date   • ADD (attention deficit disorder)    • Anxiety    • Arthritis    • Chronic pain    • Headache    • Lumbar back pain    • Tobacco abuse counseling      Past Surgical History:   Procedure Laterality Date   • FEMUR FRACTURE SURGERY  06/2009   • HIP SURGERY Left 10/2014   • HIP SURGERY Left 06/2009     Family History   Problem Relation Age of Onset   • Diabetes Mother    • Fibromyalgia Mother    • Heart disease Mother   "  • Hypertension Mother    • Cervical cancer Mother    • Hypertension Father    • No Known Problems Sister    • No Known Problems Brother    • No Known Problems Sister    • No Known Problems Sister    • No Known Problems Sister      Social History   Substance Use Topics   • Smoking status: Current Every Day Smoker     Packs/day: 1.00     Years: 8.00     Types: Cigarettes   • Smokeless tobacco: Never Used      Comment: Patient states he would love to quit.   • Alcohol use Yes      Comment: Social     Prescriptions Prior to Admission   Medication Sig Dispense Refill Last Dose   • amphetamine-dextroamphetamine (ADDERALL) 10 MG tablet Take 1 tablet by mouth 2 (Two) Times a Day.  0 6/18/2017 at 1600   • LORazepam (ATIVAN) 0.5 MG tablet Take 0.5 mg by mouth 2 (Two) Times a Day. anxiety   6/18/2017 at 1200     Allergies:  Amoxicillin-pot clavulanate; Cefaclor; Levofloxacin; and Penicillins    REVIEW OF SYSTEMS:  Please see the above history of present illness for pertinent positives and negatives.  The remainder of the patient's systems have been reviewed and are negative.   Vital Signs  Temp:  [97.3 °F (36.3 °C)-98.3 °F (36.8 °C)] 97.3 °F (36.3 °C)  Heart Rate:  [75-89] 79  Resp:  [14-20] 16  BP: (117-138)/(62-91) 117/62    Flowsheet Rows         First Filed Value    Admission Height  70\" (177.8 cm) Documented at 06/19/2017 0027    Admission Weight  145 lb (65.8 kg) Documented at 06/19/2017 0027           Physical Exam:  Physical Exam   Constitutional: Patient appears well-developed and well-nourished and in no acute distress   HEENT:   Head: Normocephalic and atraumatic.   Eyes:  Pupils are equal, round, and reactive to light.  Mouth and Throat: Patient has moist mucous membranes. Oropharynx is clear of any erythema or exudate.     Neck: Neck supple. No JVD present. No thyromegaly present. No lymphadenopathy present.  Cardiovascular: Regular rate, regular rhythm.  Pulmonary/Chest: Lungs are clear to auscultation " bilaterally.  Abdominal: Soft, nondistended, mild tenderness right lower quadrant. no rebound, no rigidity no guarding.  No obturator or psoas sign.  (Please note patient had received Dilaudid about 30 minutes prior to my examination).    Musculoskeletal: Normal posture.  Extremities: No edema. Pulses are palpable in all 4 extremities.  Neurological: Patient is alert and oriented.  Psychological:   Mood and behavior appropriate.  Skin: Skin is warm and dry.     Results Review:    I reviewed the patient's new clinical results.    Results from last 7 days  Lab Units 06/19/17 0036   WBC 10*3/mm3 17.03*   HEMOGLOBIN g/dL 13.6*   HEMATOCRIT % 40.5*   PLATELETS 10*3/mm3 506*       Results from last 7 days  Lab Units 06/19/17 0036   SODIUM mmol/L 142   POTASSIUM mmol/L 3.6   CHLORIDE mmol/L 103   TOTAL CO2 mmol/L 23.2   BUN mg/dL 11   CREATININE mg/dL 0.66*   GLUCOSE mg/dL 92   CALCIUM mg/dL 9.4     Lab Results (most recent)     Procedure Component Value Units Date/Time    CBC & Differential [[id number]] Collected:  06/19/17 0036    Specimen:  Blood Updated:  06/19/17 0045    Narrative:       The following orders were created for panel order CBC & Differential.  Procedure                               Abnormality         Status                     ---------                               -----------         ------                     CBC Auto Differential[[id number]]        Abnormal            Final result                 Please view results for these tests on the individual orders.    CBC Auto Differential [[id number]]  (Abnormal) Collected:  06/19/17 0036    Specimen:  Blood Updated:  06/19/17 0045     WBC 17.03 (H) 10*3/mm3      RBC 4.54 (L) 10*6/mm3      Hemoglobin 13.6 (L) g/dL      Hematocrit 40.5 (L) %      MCV 89.2 fL      MCH 30.0 pg      MCHC 33.6 g/dL      RDW 13.1 %      RDW-SD 43.1 fl      MPV 9.5 fL      Platelets 506 (H) 10*3/mm3      Neutrophil % 62.0 %      Lymphocyte % 25.8 %      Monocyte % 8.7 (H) %       Eosinophil % 2.6 %      Basophil % 0.5 %      Immature Grans % 0.4 %      Neutrophils, Absolute 10.55 (H) 10*3/mm3      Lymphocytes, Absolute 4.40 10*3/mm3      Monocytes, Absolute 1.48 (H) 10*3/mm3      Eosinophils, Absolute 0.45 (H) 10*3/mm3      Basophils, Absolute 0.09 10*3/mm3      Immature Grans, Absolute 0.06 (H) 10*3/mm3      nRBC 0.0 /100 WBC     Urinalysis With / Culture If Indicated [[id number]]  (Normal) Collected:  06/19/17 0036    Specimen:  Urine from Urine, Clean Catch Updated:  06/19/17 0048     Color, UA Yellow     Appearance, UA Clear     pH, UA 7.0     Specific Gravity, UA 1.015     Glucose, UA Negative     Ketones, UA Negative     Bilirubin, UA Negative     Blood, UA Negative     Protein, UA Negative     Leuk Esterase, UA Negative     Nitrite, UA Negative     Urobilinogen, UA 0.2 E.U./dL    Narrative:       Urine microscopic not indicated.    Comprehensive Metabolic Panel [[id number]]  (Abnormal) Collected:  06/19/17 0036    Specimen:  Blood Updated:  06/19/17 0113     Glucose 92 mg/dL      BUN 11 mg/dL      Creatinine 0.66 (L) mg/dL      Sodium 142 mmol/L      Potassium 3.6 mmol/L      Chloride 103 mmol/L      CO2 23.2 mmol/L      Calcium 9.4 mg/dL      Total Protein 7.1 g/dL      Albumin 4.70 g/dL      ALT (SGPT) 10 U/L      AST (SGOT) 14 U/L      Alkaline Phosphatase 82 U/L      Total Bilirubin 0.4 mg/dL      eGFR Non African Amer 148 mL/min/1.73      Globulin 2.4 gm/dL      A/G Ratio 2.0 g/dL      BUN/Creatinine Ratio 16.7     Anion Gap 15.8 mmol/L     Lipase [[id number]]  (Normal) Collected:  06/19/17 0036    Specimen:  Blood Updated:  06/19/17 0113     Lipase 14 U/L           Imaging Results (most recent)     Procedure Component Value Units Date/Time    CT Abdomen Pelvis With Contrast [[id number]] Collected:  06/19/17 0609     Updated:  06/19/17 0617    Narrative:       CT ABDOMEN AND PELVIS WITH CONTRAST, 06/19/2017:     HISTORY:  22-year-old male in the ED complaining of one day history of  right lower  quadrant abdomen pain. White blood cell count is elevated (17.0).  Evaluate for acute appendicitis.     TECHNIQUE:  CT examination of the abdomen and pelvis was performed with oral and IV  contrast. At the time of imaging, GI contrast had not reached the distal  ileum or colon. Radiation dose reduction techniques were utilized,  including automated exposure control and exposure modulation based on  body size.     ABDOMEN FINDINGS:  The appendix is normal in appearance. There is no CT evidence of acute  appendicitis.     The wall of the terminal ileum appears mildly thickened with prominent  mucosal enhancement. The findings suggest potential mild terminal  ileitis. Inflammatory bowel disease, primarily Crohn's disease, is a  consideration. There is no evidence of abscess, bowel obstruction or  additional complicating feature.     Tiny nonobstructing calculus right mid kidney measuring about 2 mm.  Kidneys, ureters and bladder are otherwise negative. No evidence of  urinary obstruction.     Nondistended gallbladder. No bile duct dilatation. Liver, pancreas and  spleen are normal in size and appearance. Normal caliber abdominal  aorta.     PELVIS FINDINGS:  Bladder, prostate and rectum appear normal. No inguinal hernia.     The lung bases are clear.       Impression:       1. The appendix is normal.  2. Possible mild terminal ileitis. Inflammatory bowel disease is a  consideration. No evidence of abscess, bowel obstruction or additional  complicating feature.  3. Tiny nonobstructing calculus right mid kidney.  4. Preliminary stat report to the ED from Dr. Christiano Merida at 0330  hours on 06/19/2017.     This report was finalized on 6/19/2017 6:15 AM by Dr. Fahad Ascencio MD.           reviewed    ECG/EMG Results (most recent)     None            Assessment/Plan     Abdominal pain-right lower quadrant  Nausea/vomiting/diarrhea  Leukocytosis  Terminal ileitis question etiology either infectious or  inflammatory (Crohn's disease).    I have reviewed the CT scan and the appendix is normal.  Would recommend bowel rest, IV fluids resuscitation and a consultation with gastroenterology.  IV ABX  Will continue serial abdominal exams.  If continues with diarrhea would recommend stool cultures  Recheck x-rays in the morning.      I discussed the patients findings and my recommendations with patient, his friend, nursing staff and hospitalist team.      Kasie Sanchez MD  06/19/17  7:11 AM

## 2017-06-19 NOTE — H&P
[facility]     Gideon Foley MD    CHIEF COMPLAINT:  Abdominal Pain    HISTORY OF PRESENT ILLNESS:    Mr. Epstein is a 21 y/o  male who presents with the abrupt onset of right sided abdominal pain.  His is a poor historian, but he notes he has been in his normal state of health until yesterday at 16:00.  He states that became nauseated, but then reports he developed sharp abdominal pain that did not radiate.  He reports he threw-up stomach acid, but then notes he threw-up Pizza.  He was unable to keep anything down.  He reports no diarrhea.  The pain was not relieved by defecation or emesis.  He notes chills , but fever was unknown.  He denies chest pain and dyspnea.        Past Medical History:   Diagnosis Date   • ADD (attention deficit disorder)    • Anxiety    • Arthritis    • Chronic pain    • Headache    • Lumbar back pain    • Tobacco abuse counseling      Past Surgical History:   Procedure Laterality Date   • FEMUR FRACTURE SURGERY  06/2009   • HIP SURGERY Left 10/2014   • HIP SURGERY Left 06/2009     Family History   Problem Relation Age of Onset   • Diabetes Mother    • Fibromyalgia Mother    • Heart disease Mother    • Hypertension Mother    • Cervical cancer Mother    • Hypertension Father    • No Known Problems Sister    • No Known Problems Brother    • No Known Problems Sister    • No Known Problems Sister    • No Known Problems Sister      Social History   Substance Use Topics   • Smoking status: Current Every Day Smoker     Packs/day: 1.00     Years: 8.00     Types: Cigarettes   • Smokeless tobacco: Never Used      Comment: Patient states he would love to quit.   • Alcohol use Yes      Comment: Social     Prescriptions Prior to Admission   Medication Sig Dispense Refill Last Dose   • amphetamine-dextroamphetamine (ADDERALL) 10 MG tablet Take 20 mg by mouth 2 (Two) Times a Day.  0 6/18/2017 at 1600   • amphetamine-dextroamphetamine (ADDERALL) 20 MG tablet  "Take 20 mg by mouth Daily. Pt nolonger taked 10 mg tab, now takes 20 mg tab 2x daily      • LORazepam (ATIVAN) 2 MG tablet Take 2 mg by mouth 2 (Two) Times a Day.      • LORazepam (ATIVAN) 0.5 MG tablet Take 2 mg by mouth 2 (Two) Times a Day. anxiety         Allergies:  Amoxicillin-pot clavulanate; Cefaclor; Levofloxacin; and Penicillins    REVIEW OF SYSTEMS:  Please see the above history of present illness for pertinent positives and negatives.  The remainder of the patient's systems have been reviewed and are negative.    Vital Signs  Temp:  [97.3 °F (36.3 °C)-98.3 °F (36.8 °C)] 97.3 °F (36.3 °C)  Heart Rate:  [75-89] 79  Resp:  [14-20] 16  BP: (117-138)/(62-91) 117/62  Oxygen Therapy  SpO2: 99 %  O2 Device: room air}  Body mass index is 21.54 kg/(m^2).     Flowsheet Rows         First Filed Value    Admission Height  70\" (177.8 cm) Documented at 06/19/2017 0027    Admission Weight  145 lb (65.8 kg) Documented at 06/19/2017 0027             Physical Exam:  Physical Exam   Constitutional: Patient appears well-developed and well-nourished and in mild distress   HEENT:   Head: Normocephalic and atraumatic.   Eyes:  Pupils are equal, round, and reactive to light. EOM are intact. Sclera are anicteric and non-injected.  Mouth and Throat: Patient has moist mucous membranes. Oropharynx is clear of any erythema or exudate.     Cardiovascular: Regular rate, regular rhythm, S1 normal and S2 normal.  Exam reveals no gallop and no friction rub.  No murmur heard.  Pulmonary/Chest: Lungs are clear to auscultation bilaterally. No respiratory distress. No wheezes. No rhonchi. No rales.   Abdominal: Soft. Bowel sounds are normal. No distension and no mass. There is no tenderness.   Musculoskeletal: Normal Muscle tone  Extremities: No edema. Pulses are palpable in all 4 extremities.  Neurological: Patient is alert and oriented to person, place, and time. Cranial nerves II-XII are grossly intact with no focal deficits.     Results " Review:    I reviewed the patient's new clinical results.  Lab Results (most recent)     Procedure Component Value Units Date/Time    CBC & Differential [[id number]] Collected:  06/19/17 0036    Specimen:  Blood Updated:  06/19/17 0045    Narrative:       The following orders were created for panel order CBC & Differential.  Procedure                               Abnormality         Status                     ---------                               -----------         ------                     CBC Auto Differential[[id number]]        Abnormal            Final result                 Please view results for these tests on the individual orders.    CBC Auto Differential [[id number]]  (Abnormal) Collected:  06/19/17 0036    Specimen:  Blood Updated:  06/19/17 0045     WBC 17.03 (H) 10*3/mm3      RBC 4.54 (L) 10*6/mm3      Hemoglobin 13.6 (L) g/dL      Hematocrit 40.5 (L) %      MCV 89.2 fL      MCH 30.0 pg      MCHC 33.6 g/dL      RDW 13.1 %      RDW-SD 43.1 fl      MPV 9.5 fL      Platelets 506 (H) 10*3/mm3      Neutrophil % 62.0 %      Lymphocyte % 25.8 %      Monocyte % 8.7 (H) %      Eosinophil % 2.6 %      Basophil % 0.5 %      Immature Grans % 0.4 %      Neutrophils, Absolute 10.55 (H) 10*3/mm3      Lymphocytes, Absolute 4.40 10*3/mm3      Monocytes, Absolute 1.48 (H) 10*3/mm3      Eosinophils, Absolute 0.45 (H) 10*3/mm3      Basophils, Absolute 0.09 10*3/mm3      Immature Grans, Absolute 0.06 (H) 10*3/mm3      nRBC 0.0 /100 WBC     Urinalysis With / Culture If Indicated [[id number]]  (Normal) Collected:  06/19/17 0036    Specimen:  Urine from Urine, Clean Catch Updated:  06/19/17 0048     Color, UA Yellow     Appearance, UA Clear     pH, UA 7.0     Specific Gravity, UA 1.015     Glucose, UA Negative     Ketones, UA Negative     Bilirubin, UA Negative     Blood, UA Negative     Protein, UA Negative     Leuk Esterase, UA Negative     Nitrite, UA Negative     Urobilinogen, UA 0.2 E.U./dL    Narrative:       Urine  microscopic not indicated.    Comprehensive Metabolic Panel [[id number]]  (Abnormal) Collected:  06/19/17 0036    Specimen:  Blood Updated:  06/19/17 0113     Glucose 92 mg/dL      BUN 11 mg/dL      Creatinine 0.66 (L) mg/dL      Sodium 142 mmol/L      Potassium 3.6 mmol/L      Chloride 103 mmol/L      CO2 23.2 mmol/L      Calcium 9.4 mg/dL      Total Protein 7.1 g/dL      Albumin 4.70 g/dL      ALT (SGPT) 10 U/L      AST (SGOT) 14 U/L      Alkaline Phosphatase 82 U/L      Total Bilirubin 0.4 mg/dL      eGFR Non African Amer 148 mL/min/1.73      Globulin 2.4 gm/dL      A/G Ratio 2.0 g/dL      BUN/Creatinine Ratio 16.7     Anion Gap 15.8 mmol/L     Lipase [[id number]]  (Normal) Collected:  06/19/17 0036    Specimen:  Blood Updated:  06/19/17 0113     Lipase 14 U/L     CBC Auto Differential [[id number]]  (Abnormal) Collected:  06/19/17 0945    Specimen:  Blood Updated:  06/19/17 0958     WBC 13.09 (H) 10*3/mm3      RBC 3.96 (L) 10*6/mm3      Hemoglobin 12.0 (L) g/dL      Hematocrit 35.7 (L) %      MCV 90.2 fL      MCH 30.3 pg      MCHC 33.6 g/dL      RDW 13.1 %      RDW-SD 43.4 fl      MPV 9.6 fL      Platelets 421 10*3/mm3      Neutrophil % 43.7 (L) %      Lymphocyte % 44.7 %      Monocyte % 7.0 %      Eosinophil % 3.9 %      Basophil % 0.5 %      Immature Grans % 0.2 %      Neutrophils, Absolute 5.73 10*3/mm3      Lymphocytes, Absolute 5.85 (H) 10*3/mm3      Monocytes, Absolute 0.91 10*3/mm3      Eosinophils, Absolute 0.51 (H) 10*3/mm3      Basophils, Absolute 0.06 10*3/mm3      Immature Grans, Absolute 0.03 10*3/mm3      nRBC 0.0 /100 WBC     Basic Metabolic Panel [[id number]]  (Abnormal) Collected:  06/19/17 0945    Specimen:  Blood Updated:  06/19/17 1014     Glucose 88 mg/dL      BUN 8 mg/dL      Creatinine 0.72 (L) mg/dL      Sodium 140 mmol/L      Potassium 3.9 mmol/L      Chloride 104 mmol/L      CO2 26.1 mmol/L      Calcium 8.4 (L) mg/dL      eGFR Non African Amer 134 mL/min/1.73      BUN/Creatinine Ratio 11.1      Anion Gap 9.9 mmol/L     Narrative:       GFR Normal >60  Chronic Kidney Disease <60  Kidney Failure <15          Imaging Results (most recent)     Procedure Component Value Units Date/Time    CT Abdomen Pelvis With Contrast [[id number]] Collected:  06/19/17 0609     Updated:  06/19/17 0617    Narrative:       CT ABDOMEN AND PELVIS WITH CONTRAST, 06/19/2017:     HISTORY:  22-year-old male in the ED complaining of one day history of right lower  quadrant abdomen pain. White blood cell count is elevated (17.0).  Evaluate for acute appendicitis.     TECHNIQUE:  CT examination of the abdomen and pelvis was performed with oral and IV  contrast. At the time of imaging, GI contrast had not reached the distal  ileum or colon. Radiation dose reduction techniques were utilized,  including automated exposure control and exposure modulation based on  body size.     ABDOMEN FINDINGS:  The appendix is normal in appearance. There is no CT evidence of acute  appendicitis.     The wall of the terminal ileum appears mildly thickened with prominent  mucosal enhancement. The findings suggest potential mild terminal  ileitis. Inflammatory bowel disease, primarily Crohn's disease, is a  consideration. There is no evidence of abscess, bowel obstruction or  additional complicating feature.     Tiny nonobstructing calculus right mid kidney measuring about 2 mm.  Kidneys, ureters and bladder are otherwise negative. No evidence of  urinary obstruction.     Nondistended gallbladder. No bile duct dilatation. Liver, pancreas and  spleen are normal in size and appearance. Normal caliber abdominal  aorta.     PELVIS FINDINGS:  Bladder, prostate and rectum appear normal. No inguinal hernia.     The lung bases are clear.       Impression:       1. The appendix is normal.  2. Possible mild terminal ileitis. Inflammatory bowel disease is a  consideration. No evidence of abscess, bowel obstruction or additional  complicating feature.  3. Tiny  nonobstructing calculus right mid kidney.  4. Preliminary stat report to the ED from Dr. Christiano Merida at 0330  hours on 06/19/2017.     This report was finalized on 6/19/2017 6:15 AM by Dr. Fahad Ascencio MD.               ECG/EMG Results (most recent)     None          Assessment/Plan     1.  Abdominal Pain: Discussed with Dr. Willard, and he does not feel strongly about IBD.  Withdrawal from ADHD and Ativan?  Leukocytosis resolving without intervention.  Patient's exam was benign while he was distracted.  Nursing notes multiple episodes of excrutiating pain, but he is tolerating his oral meds.  Will advance diet and follow.  Feel as though there may be some psychiatric undertones.    2.  ADHD:  Resumed home therapy.    3.  Anxiety:  Resumed Ativan dosing per Christiano report.    I discussed the patients findings and my recommendations with patient and staff.     Larry Bernstein MD  06/19/17  4:02 PM

## 2017-06-19 NOTE — CONSULTS
Patient Care Team:  Gideon Foley MD as PCP - General (Family Medicine)  Haseeb Hamlin MD as Consulting Physician (Urology)  Mingo Plata MD as Consulting Physician (Pain Medicine)    CHIEF COMPLAINT: Abdominal pain    HISTORY OF PRESENT ILLNESS:    21 yo with historyof chronic pain! And anxiety was fired from [employer] and has switched to [facility] , has seen pain management in past as well, with 2 previous hip surgeries and c/o LBP, yet MRI was negative this year. Also has abd pain complaints that go back to 2012 with multiple CTs and adm last year for Pneumonia and followed with ID. States he has gotten off oral narcotics just in the last month, which is supported by office notes from [facility] back in April.     He states he was fine until 4 PM yesterday developed RLQ pain that was made worse by eating again at 7P no vomiting but ER eval showed elevated WBC as well as Ct with a normal appearing appendix. Last BM was yesterday between 4-7P. He does have mild constipation but does not take additional meds for this. No previous GI evaluation. No family history of colon cancer.      Past Medical History:   Diagnosis Date   • ADD (attention deficit disorder)    • Anxiety    • Arthritis    • Chronic pain    • Headache    • Lumbar back pain    • Tobacco abuse counseling      Past Surgical History:   Procedure Laterality Date   • FEMUR FRACTURE SURGERY  06/2009   • HIP SURGERY Left 10/2014   • HIP SURGERY Left 06/2009     Family History   Problem Relation Age of Onset   • Diabetes Mother    • Fibromyalgia Mother    • Heart disease Mother    • Hypertension Mother    • Cervical cancer Mother    • Hypertension Father    • No Known Problems Sister    • No Known Problems Brother    • No Known Problems Sister    • No Known Problems Sister    • No Known Problems Sister      Social History   Substance Use Topics   • Smoking status: Current Every Day Smoker     Packs/day: 1.00     Years: 8.00     Types:  "Cigarettes   • Smokeless tobacco: Never Used      Comment: Patient states he would love to quit.   • Alcohol use Yes      Comment: Social     Prescriptions Prior to Admission   Medication Sig Dispense Refill Last Dose   • amphetamine-dextroamphetamine (ADDERALL) 10 MG tablet Take 1 tablet by mouth 2 (Two) Times a Day.  0 6/18/2017 at 1600   • LORazepam (ATIVAN) 0.5 MG tablet Take 0.5 mg by mouth 2 (Two) Times a Day. anxiety   6/18/2017 at 1200     Allergies:  Amoxicillin-pot clavulanate; Cefaclor; Levofloxacin; and Penicillins    REVIEW OF SYSTEMS:  Please see the above history of present illness for pertinent positives and negatives.  The remainder of the patient's systems have been reviewed and are negative.     Vital Signs  Temp:  [97.3 °F (36.3 °C)-98.3 °F (36.8 °C)] 97.3 °F (36.3 °C)  Heart Rate:  [75-89] 79  Resp:  [14-20] 16  BP: (117-138)/(62-91) 117/62    Flowsheet Rows         First Filed Value    Admission Height  70\" (177.8 cm) Documented at 06/19/2017 0027    Admission Weight  145 lb (65.8 kg) Documented at 06/19/2017 0027           Physical Exam:  Physical Exam   Constitutional: Patient appears well-developed and well-nourished and in no acute distress   HEENT:   Head: Normocephalic and atraumatic.   Eyes:  Pupils are equal, round, and reactive to light. EOM are intact. Sclera are anicteric and non-injected.  Mouth and Throat: Patient has moist mucous membranes. Oropharynx is clear of any erythema or exudate.     Neck: Neck supple. No JVD present. No thyromegaly present. No lymphadenopathy present.  Cardiovascular: Regular rate, regular rhythm, S1 normal and S2 normal.  Exam reveals no gallop and no friction rub.  No murmur heard.  Pulmonary/Chest: Lungs are clear to auscultation bilaterally. No respiratory distress. No wheezes. No rhonchi. No rales.   Abdominal: Soft. Bowel sounds are normal. No distension and no mass. There is no hepatosplenomegaly. There is Mild right sidded tenderness with out HSM " nor rebound.   Musculoskeletal: Normal Muscle tone  Extremities: No edema. Pulses are palpable in all 4 extremities.  Neurological: Patient is alert and oriented to person, place, and time. Cranial nerves II-XII are grossly intact with no focal deficits.  Skin: Skin is warm. No rash noted. Nails show no clubbing.  No cyanosis or erythema.     Results Review:    I reviewed the patient's new clinical results.  Lab Results (most recent)     Procedure Component Value Units Date/Time    CBC & Differential [[id number]] Collected:  06/19/17 0036    Specimen:  Blood Updated:  06/19/17 0045    Narrative:       The following orders were created for panel order CBC & Differential.  Procedure                               Abnormality         Status                     ---------                               -----------         ------                     CBC Auto Differential[[id number]]        Abnormal            Final result                 Please view results for these tests on the individual orders.    CBC Auto Differential [[id number]]  (Abnormal) Collected:  06/19/17 0036    Specimen:  Blood Updated:  06/19/17 0045     WBC 17.03 (H) 10*3/mm3      RBC 4.54 (L) 10*6/mm3      Hemoglobin 13.6 (L) g/dL      Hematocrit 40.5 (L) %      MCV 89.2 fL      MCH 30.0 pg      MCHC 33.6 g/dL      RDW 13.1 %      RDW-SD 43.1 fl      MPV 9.5 fL      Platelets 506 (H) 10*3/mm3      Neutrophil % 62.0 %      Lymphocyte % 25.8 %      Monocyte % 8.7 (H) %      Eosinophil % 2.6 %      Basophil % 0.5 %      Immature Grans % 0.4 %      Neutrophils, Absolute 10.55 (H) 10*3/mm3      Lymphocytes, Absolute 4.40 10*3/mm3      Monocytes, Absolute 1.48 (H) 10*3/mm3      Eosinophils, Absolute 0.45 (H) 10*3/mm3      Basophils, Absolute 0.09 10*3/mm3      Immature Grans, Absolute 0.06 (H) 10*3/mm3      nRBC 0.0 /100 WBC     Urinalysis With / Culture If Indicated [[id number]]  (Normal) Collected:  06/19/17 0036    Specimen:  Urine from Urine, Clean Catch  Updated:  06/19/17 0048     Color, UA Yellow     Appearance, UA Clear     pH, UA 7.0     Specific Gravity, UA 1.015     Glucose, UA Negative     Ketones, UA Negative     Bilirubin, UA Negative     Blood, UA Negative     Protein, UA Negative     Leuk Esterase, UA Negative     Nitrite, UA Negative     Urobilinogen, UA 0.2 E.U./dL    Narrative:       Urine microscopic not indicated.    Comprehensive Metabolic Panel [[id number]]  (Abnormal) Collected:  06/19/17 0036    Specimen:  Blood Updated:  06/19/17 0113     Glucose 92 mg/dL      BUN 11 mg/dL      Creatinine 0.66 (L) mg/dL      Sodium 142 mmol/L      Potassium 3.6 mmol/L      Chloride 103 mmol/L      CO2 23.2 mmol/L      Calcium 9.4 mg/dL      Total Protein 7.1 g/dL      Albumin 4.70 g/dL      ALT (SGPT) 10 U/L      AST (SGOT) 14 U/L      Alkaline Phosphatase 82 U/L      Total Bilirubin 0.4 mg/dL      eGFR Non African Amer 148 mL/min/1.73      Globulin 2.4 gm/dL      A/G Ratio 2.0 g/dL      BUN/Creatinine Ratio 16.7     Anion Gap 15.8 mmol/L     Lipase [[id number]]  (Normal) Collected:  06/19/17 0036    Specimen:  Blood Updated:  06/19/17 0113     Lipase 14 U/L     CBC Auto Differential [[id number]]  (Abnormal) Collected:  06/19/17 0945    Specimen:  Blood Updated:  06/19/17 0958     WBC 13.09 (H) 10*3/mm3      RBC 3.96 (L) 10*6/mm3      Hemoglobin 12.0 (L) g/dL      Hematocrit 35.7 (L) %      MCV 90.2 fL      MCH 30.3 pg      MCHC 33.6 g/dL      RDW 13.1 %      RDW-SD 43.4 fl      MPV 9.6 fL      Platelets 421 10*3/mm3      Neutrophil % 43.7 (L) %      Lymphocyte % 44.7 %      Monocyte % 7.0 %      Eosinophil % 3.9 %      Basophil % 0.5 %      Immature Grans % 0.2 %      Neutrophils, Absolute 5.73 10*3/mm3      Lymphocytes, Absolute 5.85 (H) 10*3/mm3      Monocytes, Absolute 0.91 10*3/mm3      Eosinophils, Absolute 0.51 (H) 10*3/mm3      Basophils, Absolute 0.06 10*3/mm3      Immature Grans, Absolute 0.03 10*3/mm3      nRBC 0.0 /100 WBC     Basic Metabolic Panel  [[id number]]  (Abnormal) Collected:  06/19/17 0945    Specimen:  Blood Updated:  06/19/17 1014     Glucose 88 mg/dL      BUN 8 mg/dL      Creatinine 0.72 (L) mg/dL      Sodium 140 mmol/L      Potassium 3.9 mmol/L      Chloride 104 mmol/L      CO2 26.1 mmol/L      Calcium 8.4 (L) mg/dL      eGFR Non African Amer 134 mL/min/1.73      BUN/Creatinine Ratio 11.1     Anion Gap 9.9 mmol/L     Narrative:       GFR Normal >60  Chronic Kidney Disease <60  Kidney Failure <15          Imaging Results (most recent)     Procedure Component Value Units Date/Time    CT Abdomen Pelvis With Contrast [[id number]] Collected:  06/19/17 0609     Updated:  06/19/17 0617    Narrative:       CT ABDOMEN AND PELVIS WITH CONTRAST, 06/19/2017:     HISTORY:  22-year-old male in the ED complaining of one day history of right lower  quadrant abdomen pain. White blood cell count is elevated (17.0).  Evaluate for acute appendicitis.     TECHNIQUE:  CT examination of the abdomen and pelvis was performed with oral and IV  contrast. At the time of imaging, GI contrast had not reached the distal  ileum or colon. Radiation dose reduction techniques were utilized,  including automated exposure control and exposure modulation based on  body size.     ABDOMEN FINDINGS:  The appendix is normal in appearance. There is no CT evidence of acute  appendicitis.     The wall of the terminal ileum appears mildly thickened with prominent  mucosal enhancement. The findings suggest potential mild terminal  ileitis. Inflammatory bowel disease, primarily Crohn's disease, is a  consideration. There is no evidence of abscess, bowel obstruction or  additional complicating feature.     Tiny nonobstructing calculus right mid kidney measuring about 2 mm.  Kidneys, ureters and bladder are otherwise negative. No evidence of  urinary obstruction.     Nondistended gallbladder. No bile duct dilatation. Liver, pancreas and  spleen are normal in size and appearance. Normal caliber  abdominal  aorta.     PELVIS FINDINGS:  Bladder, prostate and rectum appear normal. No inguinal hernia.     The lung bases are clear.       Impression:       1. The appendix is normal.  2. Possible mild terminal ileitis. Inflammatory bowel disease is a  consideration. No evidence of abscess, bowel obstruction or additional  complicating feature.  3. Tiny nonobstructing calculus right mid kidney.  4. Preliminary stat report to the ED from Dr. Christiano Merida at 0330  hours on 06/19/2017.     This report was finalized on 6/19/2017 6:15 AM by Dr. Fahad Ascencio MD.           reviewed    ECG/EMG Results (most recent)     None        reviewed    Assessment/Plan     Abdominal pain  Mild constipation  History of narcotic usage  Chronic pain   Post op hip surgery  Anxiety    Maury discussion w patient re: not using narcotics for abdominal pain that is not surgical as well as the importance of moving his bowels regularly so at this time will give him a laxative and D/C IV Dilaudid OK for clear liquids and if deemed necessary from a Hospitalist po pain meds are fine but not for his abd pain will Rx Librax for that.    I discussed the patients findings and my recommendations with patient and Dr Bernstein.     Kush Willard MD  06/19/17  11:46 AM    Time: Not Recorded

## 2017-06-19 NOTE — ED PROVIDER NOTES
Subjective   Patient is a 22 y.o. male presenting with abdominal pain.   History provided by:  Patient  Abdominal Pain   Pain location:  RLQ  Pain radiates to:  Does not radiate  Pain severity:  Severe  Onset quality:  Gradual  Timing:  Constant  Progression:  Worsening  Chronicity:  New  Context: not previous surgeries, not recent illness, not sick contacts, not suspicious food intake and not trauma    Context comment:  As described below.  Relieved by:  Nothing  Worsened by:  Movement and palpation  Ineffective treatments:  None tried  Associated symptoms: diarrhea (2 episodes.), nausea and vomiting    Associated symptoms: no anorexia, no belching, no chest pain, no chills, no constipation, no cough, no dysuria, no fatigue, no fever, no hematemesis, no hematochezia, no hematuria, no melena, no shortness of breath and no sore throat      HPI Narrative:Mr. Epstein is a 21 yo WM reason secondary to abdominal pain.  Onset of pain approximately 4 PM.  This was followed shortly by nausea and vomiting.  Patient has had multiple episodes of vomiting.  His pain has intensified.  Patient had one episode of diarrhea.  He presents for evaluation.        Review of Systems   Constitutional: Negative for chills, diaphoresis, fatigue and fever.   HENT: Negative for congestion, ear pain and sore throat.    Eyes: Negative for pain and discharge.   Respiratory: Negative for cough, chest tightness, shortness of breath, wheezing and stridor.    Cardiovascular: Negative for chest pain, palpitations and leg swelling.   Gastrointestinal: Positive for abdominal pain, diarrhea (2 episodes.), nausea and vomiting. Negative for anorexia, constipation, hematemesis, hematochezia and melena.   Genitourinary: Negative for dysuria, flank pain, frequency and hematuria.   Musculoskeletal: Negative for back pain, myalgias, neck pain and neck stiffness.   Skin: Negative for color change, pallor and rash.   Neurological: Negative for dizziness, seizures,  syncope and headaches.   Psychiatric/Behavioral: Negative for agitation and confusion. The patient is not nervous/anxious.    All other systems reviewed and are negative.      Past Medical History:   Diagnosis Date   • ADD (attention deficit disorder)    • Anxiety    • Arthritis    • Chronic pain    • Headache    • Lumbar back pain    • Tobacco abuse counseling        Allergies   Allergen Reactions   • Amoxicillin-Pot Clavulanate    • Cefaclor    • Levofloxacin Itching and Rash       Past Surgical History:   Procedure Laterality Date   • FEMUR FRACTURE SURGERY  06/2009   • HIP SURGERY Left 10/2014   • HIP SURGERY Left 06/2009       Family History   Problem Relation Age of Onset   • Diabetes Mother    • Fibromyalgia Mother    • Heart disease Mother    • Hypertension Mother    • Cervical cancer Mother    • Hypertension Father    • No Known Problems Sister    • No Known Problems Brother    • No Known Problems Sister    • No Known Problems Sister    • No Known Problems Sister        Social History     Social History   • Marital status: Single     Spouse name: N/A   • Number of children: N/A   • Years of education: N/A     Occupational History   • Student    •       Social History Main Topics   • Smoking status: Current Every Day Smoker     Packs/day: 0.50     Types: Cigarettes   • Smokeless tobacco: Not on file   • Alcohol use Yes      Comment: Social   • Drug use: No   • Sexual activity: Defer     Other Topics Concern   • Not on file     Social History Narrative   • No narrative on file           Objective   Physical Exam   Constitutional: He is oriented to person, place, and time. He appears well-developed and well-nourished. He appears distressed.   22-year-old white male laying in bed.  He is obviously in pain.  Mildly ill-appearing.    HENT:   Head: Normocephalic and atraumatic.   Right Ear: External ear normal.   Left Ear: External ear normal.   Nose: Nose normal.   Mouth/Throat: Oropharynx is clear and  moist.   Eyes: Conjunctivae and EOM are normal. Pupils are equal, round, and reactive to light.   Neck: Normal range of motion. Neck supple.   Cardiovascular: Normal rate, regular rhythm, normal heart sounds and intact distal pulses.  Exam reveals no gallop and no friction rub.    No murmur heard.  Pulmonary/Chest: Effort normal and breath sounds normal. No stridor. No respiratory distress. He has no wheezes. He has no rales.   Abdominal: Soft. Bowel sounds are normal. He exhibits no distension. There is no hepatosplenomegaly. There is tenderness in the right lower quadrant. There is rebound (mild) and tenderness at McBurney's point. There is no rigidity, no guarding, no CVA tenderness and negative Salinas's sign. No hernia.   Musculoskeletal: Normal range of motion. He exhibits no edema.   Neurological: He is alert and oriented to person, place, and time. He has normal reflexes. No cranial nerve deficit.   Skin: Skin is warm and dry. No rash noted. He is not diaphoretic. No erythema.   Psychiatric: He has a normal mood and affect. His behavior is normal.   Nursing note and vitals reviewed.      Procedures         ED Course  ED Course   Comment By Time   06/19/17  12:42 AM  Patient's history and physical exam consistent with appendicitis.  Will obtain full set of lab work and CT abdomen and pelvis.  Giving pain medication, antiemetics medication and IV fluids. Felton Flores MD 06/19 0042   06/19/17  1:21 AM  Pt reports little improvement of pain after morphine. Nausea has improved after zofran. Will give dilaudid. Felton Flores MD 06/19 0242   06/19/17  1:52 AM  Pt's pain greatly improved. He had completed 1 bottle of contrast. Working on the second. Felton Flores MD 06/19 0243   06/19/17  2:23 AM  Pain and nausea returning. Giving additional dilaudid and zofran. Labs show WBC count of 17K; other labs unremarkable. Felton Flores MD 06/19 0245   06/19/17  4:06 AM  CT shows fecalization of terminal  ileum. No appendicitis. No SBO.  Discussed with Dr. ALEJANDRO Sanchez. She is reviewing CT. Felton Flores MD 06/19 0407   06/19/17  4:15 AM  Discussed with Dr. Sanchez.  She has reviewed the CT and recommends patient be placed in observation.  Discussed with Dr. Gama.  She will place patient in observation.  Discussed at length with patient all results, diagnosis, need for hospitalization. Felton Flores MD 06/19 0383      Labs Reviewed   COMPREHENSIVE METABOLIC PANEL - Abnormal; Notable for the following:        Result Value    Creatinine 0.66 (*)     All other components within normal limits   CBC WITH AUTO DIFFERENTIAL - Abnormal; Notable for the following:     WBC 17.03 (*)     RBC 4.54 (*)     Hemoglobin 13.6 (*)     Hematocrit 40.5 (*)     Platelets 506 (*)     Monocyte % 8.7 (*)     Neutrophils, Absolute 10.55 (*)     Monocytes, Absolute 1.48 (*)     Eosinophils, Absolute 0.45 (*)     Immature Grans, Absolute 0.06 (*)     All other components within normal limits   URINALYSIS W/ CULTURE IF INDICATED - Normal    Narrative:     Urine microscopic not indicated.   LIPASE - Normal   CBC AND DIFFERENTIAL    Narrative:     The following orders were created for panel order CBC & Differential.  Procedure                               Abnormality         Status                     ---------                               -----------         ------                     CBC Auto Differential[[id number]]        Abnormal            Final result                 Please view results for these tests on the individual orders.       My differential diagnosis for abdominal pain includes but is not limited to:  Gastritis, gastroenteritis, peptic ulcer disease, GERD, esophageal perforation, acute appendicitis, mesenteric adenitis, Meckel’s diverticulum, epiploic appendagitis, diverticulitis, colon cancer, ulcerative colitis, Crohn’s disease, intussusception, small bowel obstruction, adhesions, ischemic bowel, perforated viscus, ileus,  obstipation, biliary colic, cholecystitis, cholelithiasis, Andrez-Mason Cresencio, hepatitis, pancreatitis, common bile duct obstruction, cholangitis, bile leak, splenic trauma, splenic rupture, splenic infarction, splenic abscess, abdominal abscess, ascites, spontaneous bacterial peritonitis, hernia, UTI, cystitis, prostatitis, ureterolithiasis, urinary obstruction, ovarian cyst, torsion, pregnancy, ectopic pregnancy, PID, pelvic abscess, mittelschmerz, endometriosis, AAA, myocardial infarction, pneumonia, cancer, porphyria, DKA, medications, sickle cell, viral syndrome, zoster            MDM  Number of Diagnoses or Management Options  Non-intractable vomiting with nausea, unspecified vomiting type: new and requires workup  Right lower quadrant abdominal pain: new and requires workup     Amount and/or Complexity of Data Reviewed  Clinical lab tests: ordered and reviewed  Tests in the radiology section of CPT®: ordered and reviewed  Discuss the patient with other providers: yes (Dr. ALEJANDRO Gama)    Risk of Complications, Morbidity, and/or Mortality  Presenting problems: moderate  Diagnostic procedures: high  Management options: moderate    Patient Progress  Patient progress: stable      Final diagnoses:   Right lower quadrant abdominal pain   Non-intractable vomiting with nausea, unspecified vomiting type            Felton Flores MD  06/19/17 2304

## 2017-06-19 NOTE — ED NOTES
"Patient asked if he could go outside to smoke and was told \"no\" by Dr. Flores.  Patient ambulatory to the restroom.     Karen Patten RN  06/19/17 0431    "

## 2017-06-20 ENCOUNTER — APPOINTMENT (OUTPATIENT)
Dept: GENERAL RADIOLOGY | Facility: HOSPITAL | Age: 22
End: 2017-06-20

## 2017-06-20 VITALS
RESPIRATION RATE: 14 BRPM | SYSTOLIC BLOOD PRESSURE: 106 MMHG | OXYGEN SATURATION: 98 % | BODY MASS INDEX: 21.49 KG/M2 | WEIGHT: 150.1 LBS | TEMPERATURE: 97.6 F | DIASTOLIC BLOOD PRESSURE: 62 MMHG | HEART RATE: 70 BPM | HEIGHT: 70 IN

## 2017-06-20 LAB
ANION GAP SERPL CALCULATED.3IONS-SCNC: 10.7 MMOL/L
BASOPHILS # BLD AUTO: 0.06 10*3/MM3 (ref 0–0.2)
BASOPHILS NFR BLD AUTO: 0.5 % (ref 0–2)
BUN BLD-MCNC: 7 MG/DL (ref 6–20)
BUN/CREAT SERPL: 11.9 (ref 7–25)
CALCIUM SPEC-SCNC: 8.1 MG/DL (ref 8.6–10.5)
CHLORIDE SERPL-SCNC: 107 MMOL/L (ref 98–107)
CO2 SERPL-SCNC: 25.3 MMOL/L (ref 22–29)
CREAT BLD-MCNC: 0.59 MG/DL (ref 0.76–1.27)
DEPRECATED RDW RBC AUTO: 44.1 FL (ref 37–54)
EOSINOPHIL # BLD AUTO: 0.46 10*3/MM3 (ref 0.1–0.3)
EOSINOPHIL NFR BLD AUTO: 4.1 % (ref 0–4)
ERYTHROCYTE [DISTWIDTH] IN BLOOD BY AUTOMATED COUNT: 13.2 % (ref 11.5–14.5)
GFR SERPL CREATININE-BSD FRML MDRD: >150 ML/MIN/1.73
GLUCOSE BLD-MCNC: 86 MG/DL (ref 65–99)
HCT VFR BLD AUTO: 35.2 % (ref 42–52)
HGB BLD-MCNC: 11.7 G/DL (ref 14–18)
IMM GRANULOCYTES # BLD: 0.03 10*3/MM3 (ref 0–0.03)
IMM GRANULOCYTES NFR BLD: 0.3 % (ref 0–0.5)
LYMPHOCYTES # BLD AUTO: 3.8 10*3/MM3 (ref 0.6–4.8)
LYMPHOCYTES NFR BLD AUTO: 34.2 % (ref 20–45)
MCH RBC QN AUTO: 30.2 PG (ref 27–31)
MCHC RBC AUTO-ENTMCNC: 33.2 G/DL (ref 31–37)
MCV RBC AUTO: 91 FL (ref 80–94)
MONOCYTES # BLD AUTO: 0.61 10*3/MM3 (ref 0–1)
MONOCYTES NFR BLD AUTO: 5.5 % (ref 3–8)
NEUTROPHILS # BLD AUTO: 6.16 10*3/MM3 (ref 1.5–8.3)
NEUTROPHILS NFR BLD AUTO: 55.4 % (ref 45–70)
NRBC BLD MANUAL-RTO: 0 /100 WBC (ref 0–0)
PLATELET # BLD AUTO: 419 10*3/MM3 (ref 140–500)
PMV BLD AUTO: 10 FL (ref 7.4–10.4)
POTASSIUM BLD-SCNC: 4.1 MMOL/L (ref 3.5–5.2)
RBC # BLD AUTO: 3.87 10*6/MM3 (ref 4.7–6.1)
SODIUM BLD-SCNC: 143 MMOL/L (ref 136–145)
WBC NRBC COR # BLD: 11.12 10*3/MM3 (ref 4.8–10.8)

## 2017-06-20 PROCEDURE — 96376 TX/PRO/DX INJ SAME DRUG ADON: CPT

## 2017-06-20 PROCEDURE — 96361 HYDRATE IV INFUSION ADD-ON: CPT

## 2017-06-20 PROCEDURE — 99217 PR OBSERVATION CARE DISCHARGE MANAGEMENT: CPT | Performed by: HOSPITALIST

## 2017-06-20 PROCEDURE — 80048 BASIC METABOLIC PNL TOTAL CA: CPT | Performed by: SURGERY

## 2017-06-20 PROCEDURE — 74022 RADEX COMPL AQT ABD SERIES: CPT

## 2017-06-20 PROCEDURE — G0378 HOSPITAL OBSERVATION PER HR: HCPCS

## 2017-06-20 PROCEDURE — 85025 COMPLETE CBC W/AUTO DIFF WBC: CPT | Performed by: SURGERY

## 2017-06-20 PROCEDURE — 99224 PR SBSQ OBSERVATION CARE/DAY 15 MINUTES: CPT | Performed by: SURGERY

## 2017-06-20 RX ADMIN — SODIUM CHLORIDE 100 ML/HR: 9 INJECTION, SOLUTION INTRAVENOUS at 14:10

## 2017-06-20 RX ADMIN — TRAMADOL HYDROCHLORIDE 50 MG: 50 TABLET, FILM COATED ORAL at 06:17

## 2017-06-20 RX ADMIN — LORAZEPAM 2 MG: 1 TABLET ORAL at 08:53

## 2017-06-20 RX ADMIN — SODIUM CHLORIDE 100 ML/HR: 9 INJECTION, SOLUTION INTRAVENOUS at 02:52

## 2017-06-20 RX ADMIN — CHLORDIAZEPOXIDE HYDROCHLORIDE AND CLIDINIUM BROMIDE 1 CAPSULE: 5; 2.5 CAPSULE ORAL at 08:43

## 2017-06-20 RX ADMIN — CHLORDIAZEPOXIDE HYDROCHLORIDE AND CLIDINIUM BROMIDE 1 CAPSULE: 5; 2.5 CAPSULE ORAL at 11:57

## 2017-06-20 RX ADMIN — NICOTINE 1 PATCH: 21 PATCH TRANSDERMAL at 08:44

## 2017-06-20 RX ADMIN — PANTOPRAZOLE SODIUM 40 MG: 40 INJECTION, POWDER, FOR SOLUTION INTRAVENOUS at 06:14

## 2017-06-20 RX ADMIN — TRAMADOL HYDROCHLORIDE 50 MG: 50 TABLET, FILM COATED ORAL at 11:57

## 2017-06-20 NOTE — PLAN OF CARE
Problem: Fall Risk (Adult)  Goal: Absence of Falls  Outcome: Ongoing (interventions implemented as appropriate)    Problem: Pain, Chronic (Adult)  Goal: Identify Related Risk Factors and Signs and Symptoms  Outcome: Ongoing (interventions implemented as appropriate)  Goal: Acceptable Pain Control/Comfort Level  Outcome: Ongoing (interventions implemented as appropriate)

## 2017-06-20 NOTE — NURSING NOTE
Pt d/c home, pt a/o x 3, no c/o pain at this time, no s/s of distress noted, d/c instructions given, pt verbalizes understanding, pt iv removed earlier, tolerated without diff

## 2017-06-20 NOTE — PLAN OF CARE
Problem: Pain, Chronic (Adult)  Goal: Identify Related Risk Factors and Signs and Symptoms  Outcome: Ongoing (interventions implemented as appropriate)  Goal: Acceptable Pain Control/Comfort Level  Outcome: Ongoing (interventions implemented as appropriate)

## 2017-06-20 NOTE — PROGRESS NOTES
General Surgery Progress Note      Chief Complaint:  Follow-up abdominal pain      Interval History: Complaining of abdominal pain and diarrhea.  Denies nausea and vomiting.  Some clear liquids.  Tolerating them well per nursing staff.    Abdominal x-ray done this morning I have reviewed the images-normal bowel gas pattern, no bowel obstruction.    Objective     Vital Signs  Temp:  [97.1 °F (36.2 °C)-98.3 °F (36.8 °C)] 97.8 °F (36.6 °C)  Heart Rate:  [78-91] 84  Resp:  [16-20] 18  BP: (103-126)/(63-81) 103/63  Body mass index is 21.54 kg/(m^2).    Intake/Output Summary (Last 24 hours) at 06/20/17 0834  Last data filed at 06/20/17 0612   Gross per 24 hour   Intake          2949.67 ml   Output                0 ml   Net          2949.67 ml             Physical Exam:   General: patient awake, alert and cooperative   Cardiovascular: regular rhythm and rate, no murmurs auscultated   Pulm: clear to auscultation bilaterally, regular and unlabored   Abdomen: soft, Mild diffuse tenderness lower abdomen, no peritoneal signs nondistended; normal bowel sounds   Extremities: no rash or edema   Neurologic: Normal mood and behavior     Results Review:     I reviewed the patient's new clinical results.    Lab Results (last 24 hours)     Procedure Component Value Units Date/Time    CBC Auto Differential [[id number]]  (Abnormal) Collected:  06/19/17 0945    Specimen:  Blood Updated:  06/19/17 0958     WBC 13.09 (H) 10*3/mm3      RBC 3.96 (L) 10*6/mm3      Hemoglobin 12.0 (L) g/dL      Hematocrit 35.7 (L) %      MCV 90.2 fL      MCH 30.3 pg      MCHC 33.6 g/dL      RDW 13.1 %      RDW-SD 43.4 fl      MPV 9.6 fL      Platelets 421 10*3/mm3      Neutrophil % 43.7 (L) %      Lymphocyte % 44.7 %      Monocyte % 7.0 %      Eosinophil % 3.9 %      Basophil % 0.5 %      Immature Grans % 0.2 %      Neutrophils, Absolute 5.73 10*3/mm3      Lymphocytes, Absolute 5.85 (H) 10*3/mm3      Monocytes, Absolute 0.91 10*3/mm3      Eosinophils, Absolute  0.51 (H) 10*3/mm3      Basophils, Absolute 0.06 10*3/mm3      Immature Grans, Absolute 0.03 10*3/mm3      nRBC 0.0 /100 WBC     Basic Metabolic Panel [[id number]]  (Abnormal) Collected:  06/19/17 0945    Specimen:  Blood Updated:  06/19/17 1014     Glucose 88 mg/dL      BUN 8 mg/dL      Creatinine 0.72 (L) mg/dL      Sodium 140 mmol/L      Potassium 3.9 mmol/L      Chloride 104 mmol/L      CO2 26.1 mmol/L      Calcium 8.4 (L) mg/dL      eGFR Non African Amer 134 mL/min/1.73      BUN/Creatinine Ratio 11.1     Anion Gap 9.9 mmol/L     Narrative:       GFR Normal >60  Chronic Kidney Disease <60  Kidney Failure <15    CBC & Differential [[id number]] Collected:  06/20/17 0424    Specimen:  Blood Updated:  06/20/17 0451    Narrative:       The following orders were created for panel order CBC & Differential.  Procedure                               Abnormality         Status                     ---------                               -----------         ------                     CBC Auto Differential[[id number]]        Abnormal            Final result                 Please view results for these tests on the individual orders.    CBC Auto Differential [[id number]]  (Abnormal) Collected:  06/20/17 0424    Specimen:  Blood Updated:  06/20/17 0451     WBC 11.12 (H) 10*3/mm3      RBC 3.87 (L) 10*6/mm3      Hemoglobin 11.7 (L) g/dL      Hematocrit 35.2 (L) %      MCV 91.0 fL      MCH 30.2 pg      MCHC 33.2 g/dL      RDW 13.2 %      RDW-SD 44.1 fl      MPV 10.0 fL      Platelets 419 10*3/mm3      Neutrophil % 55.4 %      Lymphocyte % 34.2 %      Monocyte % 5.5 %      Eosinophil % 4.1 (H) %      Basophil % 0.5 %      Immature Grans % 0.3 %      Neutrophils, Absolute 6.16 10*3/mm3      Lymphocytes, Absolute 3.80 10*3/mm3      Monocytes, Absolute 0.61 10*3/mm3      Eosinophils, Absolute 0.46 (H) 10*3/mm3      Basophils, Absolute 0.06 10*3/mm3      Immature Grans, Absolute 0.03 10*3/mm3      nRBC 0.0 /100 WBC     Basic Metabolic  Panel [[id number]]  (Abnormal) Collected:  06/20/17 0424    Specimen:  Blood Updated:  06/20/17 0535     Glucose 86 mg/dL      BUN 7 mg/dL      Creatinine 0.59 (L) mg/dL      Sodium 143 mmol/L      Potassium 4.1 mmol/L      Chloride 107 mmol/L      CO2 25.3 mmol/L      Calcium 8.1 (L) mg/dL      eGFR Non African Amer >150 mL/min/1.73      BUN/Creatinine Ratio 11.9     Anion Gap 10.7 mmol/L     Narrative:       GFR Normal >60  Chronic Kidney Disease <60  Kidney Failure <15          Radiology:    Imaging Results (last 72 hours)     Procedure Component Value Units Date/Time    CT Abdomen Pelvis With Contrast [[id number]] Collected:  06/19/17 0609     Updated:  06/19/17 0617    Narrative:       CT ABDOMEN AND PELVIS WITH CONTRAST, 06/19/2017:     HISTORY:  22-year-old male in the ED complaining of one day history of right lower  quadrant abdomen pain. White blood cell count is elevated (17.0).  Evaluate for acute appendicitis.     TECHNIQUE:  CT examination of the abdomen and pelvis was performed with oral and IV  contrast. At the time of imaging, GI contrast had not reached the distal  ileum or colon. Radiation dose reduction techniques were utilized,  including automated exposure control and exposure modulation based on  body size.     ABDOMEN FINDINGS:  The appendix is normal in appearance. There is no CT evidence of acute  appendicitis.     The wall of the terminal ileum appears mildly thickened with prominent  mucosal enhancement. The findings suggest potential mild terminal  ileitis. Inflammatory bowel disease, primarily Crohn's disease, is a  consideration. There is no evidence of abscess, bowel obstruction or  additional complicating feature.     Tiny nonobstructing calculus right mid kidney measuring about 2 mm.  Kidneys, ureters and bladder are otherwise negative. No evidence of  urinary obstruction.     Nondistended gallbladder. No bile duct dilatation. Liver, pancreas and  spleen are normal in size and  appearance. Normal caliber abdominal  aorta.     PELVIS FINDINGS:  Bladder, prostate and rectum appear normal. No inguinal hernia.     The lung bases are clear.       Impression:       1. The appendix is normal.  2. Possible mild terminal ileitis. Inflammatory bowel disease is a  consideration. No evidence of abscess, bowel obstruction or additional  complicating feature.  3. Tiny nonobstructing calculus right mid kidney.  4. Preliminary stat report to the ED from Dr. Christiano Merida at 0330  hours on 06/19/2017.     This report was finalized on 6/19/2017 6:15 AM by Dr. Fahad Ascencio MD.       XR Abdomen 2 View With Chest 1 View [[id number]] Updated:  06/20/17 0809            sodium chloride 100 mL/hr Last Rate: 100 mL/hr (06/20/17 0612)           Assessment/Plan     Patient Active Problem List   Diagnosis Code   • Degenerative arthritis of hip M16.9   • Abdominal pain R10.9   • Allergic rhinitis J30.9   • Anxiety F41.9   • Arthralgia of hip M25.559   • Chronic pain due to injury G89.21   • Complication of internal orthopedic prosthetic device, implant, or graft T84.9XXA   • Concussion S06.0X9A   • CN (constipation) K59.00   • Folliculitis L73.9   • Adenopathy R59.1   • Chronic nausea R11.0   • Awareness of heartbeats R00.2   • Brain syndrome, 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  Would recommend advance diet as tolerated  Will defer to gastroenterology at this point regarding constipation in light of chronic narcotic use    We'll follow peripherally     Kasie Sanchez MD  06/20/17  8:34 AM    Time:

## 2017-06-20 NOTE — PROGRESS NOTES
Information and education regarding living will provided to patient.  Patient will call me if he has further questions or wishes to complete living will.    Cherelle Manrique

## 2017-06-20 NOTE — DISCHARGE SUMMARY
Mauro Epstein SHAHRAM  1995  [id number]        Hospitalists Discharge Summary    Date of Admission: 6/19/2017  Date of Discharge:  6/20/2017    Primary Discharge Diagnoses:    1.  Abdominal Pain, Unspecified      Secondary Discharge Diagnoses:    1.  ADHD  2.  Anxiety    PCP  Patient Care Team:  Gideon Foley MD as PCP - General (Family Medicine)  Haseeb Hamlin MD as Consulting Physician (Urology)  Mingo Plata MD as Consulting Physician (Pain Medicine)    Consults:   Consults     Date and Time Order Name Status Description    6/19/2017 0453 Inpatient Consult to Gastroenterology Completed     6/19/2017 0418 IP Consult to General Surgery Completed           Operations and Procedures Performed:       Xr Abdomen 2 View With Chest 1 View    Result Date: 6/20/2017  Narrative: ACUTE ABDOMEN SERIES, 06/20/2017:  HISTORY: 22-year-old male admitted to the hospital yesterday with one-day history of right lower quadrant abdomen pain. Possible mild inflammation of the terminal ileum on CT examination. Exam for follow-up.  TECHNIQUE: Flat and upright abdomen series with AP upright chest x-ray.  FINDINGS: Bowel gas pattern is normal. No evidence of bowel obstruction, adynamic ileus or bowel perforation. No radiopaque abdominal calculi.  Chest x-ray shows no active disease. The lungs appear clear.      Impression: Negative acute abdomen series.  This report was finalized on 6/20/2017 8:33 AM by Dr. Fahad Ascencio MD.      Ct Abdomen Pelvis With Contrast    Result Date: 6/19/2017  Narrative: CT ABDOMEN AND PELVIS WITH CONTRAST, 06/19/2017:  HISTORY: 22-year-old male in the ED complaining of one day history of right lower quadrant abdomen pain. White blood cell count is elevated (17.0). Evaluate for acute appendicitis.  TECHNIQUE: CT examination of the abdomen and pelvis was performed with oral and IV contrast. At the time of imaging, GI contrast had not reached the distal ileum or colon. Radiation  dose reduction techniques were utilized, including automated exposure control and exposure modulation based on body size.  ABDOMEN FINDINGS: The appendix is normal in appearance. There is no CT evidence of acute appendicitis.  The wall of the terminal ileum appears mildly thickened with prominent mucosal enhancement. The findings suggest potential mild terminal ileitis. Inflammatory bowel disease, primarily Crohn's disease, is a consideration. There is no evidence of abscess, bowel obstruction or additional complicating feature.  Tiny nonobstructing calculus right mid kidney measuring about 2 mm. Kidneys, ureters and bladder are otherwise negative. No evidence of urinary obstruction.  Nondistended gallbladder. No bile duct dilatation. Liver, pancreas and spleen are normal in size and appearance. Normal caliber abdominal aorta.  PELVIS FINDINGS: Bladder, prostate and rectum appear normal. No inguinal hernia.  The lung bases are clear.      Impression: 1. The appendix is normal. 2. Possible mild terminal ileitis. Inflammatory bowel disease is a consideration. No evidence of abscess, bowel obstruction or additional complicating feature. 3. Tiny nonobstructing calculus right mid kidney. 4. Preliminary stat report to the ED from Dr. Christiano Merida at 0330 hours on 06/19/2017.  This report was finalized on 6/19/2017 6:15 AM by Dr. Fahad Ascencio MD.        Allergies:  is allergic to amoxicillin-pot clavulanate; cefaclor; levofloxacin; and penicillins.    Christiano  reviewed    Discharge Medications:   Mauro Epstein II   Home Medication Instructions [id number]    Printed on:06/20/17 6155   Medication Information                      amphetamine-dextroamphetamine (ADDERALL) 20 MG tablet  Take 20 mg by mouth Daily. Pt nolonger taked 10 mg tab, now takes 20 mg tab 2x daily             LORazepam (ATIVAN) 2 MG tablet  Take 2 mg by mouth 2 (Two) Times a Day.                 History of Present Illness (taken from  H&P):    Mr. Epstein is a 23 y/o  male who presents with the abrupt onset of right sided abdominal pain. His is a poor historian, but he notes he has been in his normal state of health until yesterday at 16:00. He states that became nauseated, but then reports he developed sharp abdominal pain that did not radiate. He reports he threw-up stomach acid, but then notes he threw-up Pizza. He was unable to keep anything down. He reports no diarrhea. The pain was not relieved by defecation or emesis. He notes chills , but fever was unknown. He denies chest pain and dyspnea.        Hospital Course    Mr. Epstein was admitted to the Med/Surg unit.  He was seen in consultation by Bhavesh Sanchez and Montse.  No surgical intervention was pursued, and Dr. Willard recommended initiating Librax.  He was tolerating PO, but still had episodes of excrutiating pain that was not related to his PO intake.      Last Lab Results:   Lab Results (most recent)     Procedure Component Value Units Date/Time    CBC & Differential [[id number]] Collected:  06/19/17 0036    Specimen:  Blood Updated:  06/19/17 0045    Narrative:       The following orders were created for panel order CBC & Differential.  Procedure                               Abnormality         Status                     ---------                               -----------         ------                     CBC Auto Differential[[id number]]        Abnormal            Final result                 Please view results for these tests on the individual orders.    CBC Auto Differential [[id number]]  (Abnormal) Collected:  06/19/17 0036    Specimen:  Blood Updated:  06/19/17 0045     WBC 17.03 (H) 10*3/mm3      RBC 4.54 (L) 10*6/mm3      Hemoglobin 13.6 (L) g/dL      Hematocrit 40.5 (L) %      MCV 89.2 fL      MCH 30.0 pg      MCHC 33.6 g/dL      RDW 13.1 %      RDW-SD 43.1 fl      MPV 9.5 fL      Platelets 506 (H) 10*3/mm3      Neutrophil % 62.0 %      Lymphocyte % 25.8 %       Monocyte % 8.7 (H) %      Eosinophil % 2.6 %      Basophil % 0.5 %      Immature Grans % 0.4 %      Neutrophils, Absolute 10.55 (H) 10*3/mm3      Lymphocytes, Absolute 4.40 10*3/mm3      Monocytes, Absolute 1.48 (H) 10*3/mm3      Eosinophils, Absolute 0.45 (H) 10*3/mm3      Basophils, Absolute 0.09 10*3/mm3      Immature Grans, Absolute 0.06 (H) 10*3/mm3      nRBC 0.0 /100 WBC     Urinalysis With / Culture If Indicated [[id number]]  (Normal) Collected:  06/19/17 0036    Specimen:  Urine from Urine, Clean Catch Updated:  06/19/17 0048     Color, UA Yellow     Appearance, UA Clear     pH, UA 7.0     Specific Gravity, UA 1.015     Glucose, UA Negative     Ketones, UA Negative     Bilirubin, UA Negative     Blood, UA Negative     Protein, UA Negative     Leuk Esterase, UA Negative     Nitrite, UA Negative     Urobilinogen, UA 0.2 E.U./dL    Narrative:       Urine microscopic not indicated.    Comprehensive Metabolic Panel [[id number]]  (Abnormal) Collected:  06/19/17 0036    Specimen:  Blood Updated:  06/19/17 0113     Glucose 92 mg/dL      BUN 11 mg/dL      Creatinine 0.66 (L) mg/dL      Sodium 142 mmol/L      Potassium 3.6 mmol/L      Chloride 103 mmol/L      CO2 23.2 mmol/L      Calcium 9.4 mg/dL      Total Protein 7.1 g/dL      Albumin 4.70 g/dL      ALT (SGPT) 10 U/L      AST (SGOT) 14 U/L      Alkaline Phosphatase 82 U/L      Total Bilirubin 0.4 mg/dL      eGFR Non African Amer 148 mL/min/1.73      Globulin 2.4 gm/dL      A/G Ratio 2.0 g/dL      BUN/Creatinine Ratio 16.7     Anion Gap 15.8 mmol/L     Lipase [[id number]]  (Normal) Collected:  06/19/17 0036    Specimen:  Blood Updated:  06/19/17 0113     Lipase 14 U/L     CBC Auto Differential [[id number]]  (Abnormal) Collected:  06/19/17 0945    Specimen:  Blood Updated:  06/19/17 0958     WBC 13.09 (H) 10*3/mm3      RBC 3.96 (L) 10*6/mm3      Hemoglobin 12.0 (L) g/dL      Hematocrit 35.7 (L) %      MCV 90.2 fL      MCH 30.3 pg      MCHC 33.6 g/dL      RDW 13.1 %       RDW-SD 43.4 fl      MPV 9.6 fL      Platelets 421 10*3/mm3      Neutrophil % 43.7 (L) %      Lymphocyte % 44.7 %      Monocyte % 7.0 %      Eosinophil % 3.9 %      Basophil % 0.5 %      Immature Grans % 0.2 %      Neutrophils, Absolute 5.73 10*3/mm3      Lymphocytes, Absolute 5.85 (H) 10*3/mm3      Monocytes, Absolute 0.91 10*3/mm3      Eosinophils, Absolute 0.51 (H) 10*3/mm3      Basophils, Absolute 0.06 10*3/mm3      Immature Grans, Absolute 0.03 10*3/mm3      nRBC 0.0 /100 WBC     Basic Metabolic Panel [[id number]]  (Abnormal) Collected:  06/19/17 0945    Specimen:  Blood Updated:  06/19/17 1014     Glucose 88 mg/dL      BUN 8 mg/dL      Creatinine 0.72 (L) mg/dL      Sodium 140 mmol/L      Potassium 3.9 mmol/L      Chloride 104 mmol/L      CO2 26.1 mmol/L      Calcium 8.4 (L) mg/dL      eGFR Non African Amer 134 mL/min/1.73      BUN/Creatinine Ratio 11.1     Anion Gap 9.9 mmol/L     Narrative:       GFR Normal >60  Chronic Kidney Disease <60  Kidney Failure <15    CBC & Differential [[id number]] Collected:  06/20/17 0424    Specimen:  Blood Updated:  06/20/17 0451    Narrative:       The following orders were created for panel order CBC & Differential.  Procedure                               Abnormality         Status                     ---------                               -----------         ------                     CBC Auto Differential[[id number]]        Abnormal            Final result                 Please view results for these tests on the individual orders.    CBC Auto Differential [[id number]]  (Abnormal) Collected:  06/20/17 0424    Specimen:  Blood Updated:  06/20/17 0451     WBC 11.12 (H) 10*3/mm3      RBC 3.87 (L) 10*6/mm3      Hemoglobin 11.7 (L) g/dL      Hematocrit 35.2 (L) %      MCV 91.0 fL      MCH 30.2 pg      MCHC 33.2 g/dL      RDW 13.2 %      RDW-SD 44.1 fl      MPV 10.0 fL      Platelets 419 10*3/mm3      Neutrophil % 55.4 %      Lymphocyte % 34.2 %      Monocyte % 5.5 %       Eosinophil % 4.1 (H) %      Basophil % 0.5 %      Immature Grans % 0.3 %      Neutrophils, Absolute 6.16 10*3/mm3      Lymphocytes, Absolute 3.80 10*3/mm3      Monocytes, Absolute 0.61 10*3/mm3      Eosinophils, Absolute 0.46 (H) 10*3/mm3      Basophils, Absolute 0.06 10*3/mm3      Immature Grans, Absolute 0.03 10*3/mm3      nRBC 0.0 /100 WBC     Basic Metabolic Panel [[id number]]  (Abnormal) Collected:  06/20/17 0424    Specimen:  Blood Updated:  06/20/17 0535     Glucose 86 mg/dL      BUN 7 mg/dL      Creatinine 0.59 (L) mg/dL      Sodium 143 mmol/L      Potassium 4.1 mmol/L      Chloride 107 mmol/L      CO2 25.3 mmol/L      Calcium 8.1 (L) mg/dL      eGFR Non African Amer >150 mL/min/1.73      BUN/Creatinine Ratio 11.9     Anion Gap 10.7 mmol/L     Narrative:       GFR Normal >60  Chronic Kidney Disease <60  Kidney Failure <15        Imaging Results (most recent)     Procedure Component Value Units Date/Time    CT Abdomen Pelvis With Contrast [[id number]] Collected:  06/19/17 0609     Updated:  06/19/17 0617    Narrative:       CT ABDOMEN AND PELVIS WITH CONTRAST, 06/19/2017:     HISTORY:  22-year-old male in the ED complaining of one day history of right lower  quadrant abdomen pain. White blood cell count is elevated (17.0).  Evaluate for acute appendicitis.     TECHNIQUE:  CT examination of the abdomen and pelvis was performed with oral and IV  contrast. At the time of imaging, GI contrast had not reached the distal  ileum or colon. Radiation dose reduction techniques were utilized,  including automated exposure control and exposure modulation based on  body size.     ABDOMEN FINDINGS:  The appendix is normal in appearance. There is no CT evidence of acute  appendicitis.     The wall of the terminal ileum appears mildly thickened with prominent  mucosal enhancement. The findings suggest potential mild terminal  ileitis. Inflammatory bowel disease, primarily Crohn's disease, is a  consideration. There is no  evidence of abscess, bowel obstruction or  additional complicating feature.     Tiny nonobstructing calculus right mid kidney measuring about 2 mm.  Kidneys, ureters and bladder are otherwise negative. No evidence of  urinary obstruction.     Nondistended gallbladder. No bile duct dilatation. Liver, pancreas and  spleen are normal in size and appearance. Normal caliber abdominal  aorta.     PELVIS FINDINGS:  Bladder, prostate and rectum appear normal. No inguinal hernia.     The lung bases are clear.       Impression:       1. The appendix is normal.  2. Possible mild terminal ileitis. Inflammatory bowel disease is a  consideration. No evidence of abscess, bowel obstruction or additional  complicating feature.  3. Tiny nonobstructing calculus right mid kidney.  4. Preliminary stat report to the ED from Dr. Christiano Merida at 0330  hours on 06/19/2017.     This report was finalized on 6/19/2017 6:15 AM by Dr. Fahad Ascencio MD.       XR Abdomen 2 View With Chest 1 View [[id number]] Collected:  06/20/17 0832     Updated:  06/20/17 0835    Narrative:       ACUTE ABDOMEN SERIES, 06/20/2017:     HISTORY:   22-year-old male admitted to the hospital yesterday with one-day history  of right lower quadrant abdomen pain. Possible mild inflammation of the  terminal ileum on CT examination. Exam for follow-up.     TECHNIQUE:  Flat and upright abdomen series with AP upright chest x-ray.     FINDINGS:  Bowel gas pattern is normal. No evidence of bowel obstruction, adynamic  ileus or bowel perforation. No radiopaque abdominal calculi.     Chest x-ray shows no active disease. The lungs appear clear.       Impression:       Negative acute abdomen series.     This report was finalized on 6/20/2017 8:33 AM by Dr. Fahad Ascencio MD.             PROCEDURES      Condition on Discharge: Stable    Physical Exam at Discharge  Vital Signs  Temp:  [97 °F (36.1 °C)-97.8 °F (36.6 °C)] 97.6 °F (36.4 °C)  Heart Rate:  [70-91] 70  Resp:   [14-18] 14  BP: (103-112)/(62-73) 106/62    Physical Exam:  Physical Exam   Constitutional: Patient appears well-developed and well-nourished and in no acute distress   Cardiovascular: Regular rate, regular rhythm, S1 normal and S2 normal.  Exam reveals no gallop and no friction rub.  No murmur heard.  Pulmonary/Chest: Lungs are clear to auscultation bilaterally. No respiratory distress. No wheezes. No rhonchi. No rales.   Abdominal: Soft. Bowel sounds are normal. No distension and no mass. There is no hepatosplenomegaly. There is no tenderness.   Musculoskeletal: Normal Muscle tone  Neurological: Cranial nerves II-XII are grossly intact with no focal deficits.  Skin: Skin is warm. No rash noted. Nails show no clubbing.  No cyanosis or erythema.    Discharge Disposition  Home    Visiting Nurse:    No     Home PT/OT:  No     Home Safety Evaluation:  No     DME  None    Discharge Diet:           Dietary Orders            Start     Ordered    06/20/17 0942  Diet Regular; Vegetarian; Vegan: no animal products whatsoever  Diet Effective Now     Question Answer Comment   Diet Texture / Consistency Regular    Common Modifiers Vegetarian    Vegetarian Level: Vegan: no animal products whatsoever        06/20/17 0942          Activity at Discharge:  As tolerated    Follow-up Appointments  No future appointments.  Additional Instructions for the Follow-ups that You Need to Schedule     Discharge Follow-up with PCP    As directed    Follow Up Details:  Dr. Foley Thursday or Friday this week                 Test Results Pending at Discharge  None     Larry Bernstein MD  06/20/17  4:26 PM

## 2017-07-14 ENCOUNTER — HOSPITAL ENCOUNTER (EMERGENCY)
Facility: HOSPITAL | Age: 22
Discharge: HOME | End: 2017-07-14
Attending: EMERGENCY MEDICINE | Admitting: EMERGENCY MEDICINE

## 2017-07-14 ENCOUNTER — APPOINTMENT (OUTPATIENT)
Dept: CT IMAGING | Facility: HOSPITAL | Age: 22
End: 2017-07-14

## 2017-07-14 VITALS
SYSTOLIC BLOOD PRESSURE: 123 MMHG | DIASTOLIC BLOOD PRESSURE: 64 MMHG | HEART RATE: 78 BPM | WEIGHT: 145 LBS | HEIGHT: 70 IN | TEMPERATURE: 98.2 F | RESPIRATION RATE: 14 BRPM | OXYGEN SATURATION: 99 % | BODY MASS INDEX: 20.76 KG/M2

## 2017-07-14 DIAGNOSIS — R10.9 RIGHT SIDED ABDOMINAL PAIN: Primary | ICD-10-CM

## 2017-07-14 LAB
ALBUMIN SERPL-MCNC: 4.8 G/DL (ref 3.5–5.2)
ALBUMIN/GLOB SERPL: 2.1 G/DL
ALP SERPL-CCNC: 95 U/L (ref 40–129)
ALT SERPL W P-5'-P-CCNC: 14 U/L (ref 5–41)
AMPHET+METHAMPHET UR QL: NEGATIVE
AMPHETAMINES UR QL: NEGATIVE
AMYLASE SERPL-CCNC: 54 U/L (ref 28–100)
ANION GAP SERPL CALCULATED.3IONS-SCNC: 14.5 MMOL/L
AST SERPL-CCNC: 17 U/L (ref 5–40)
BARBITURATES UR QL SCN: NEGATIVE
BASOPHILS # BLD AUTO: 0.05 10*3/MM3 (ref 0–0.2)
BASOPHILS NFR BLD AUTO: 0.4 % (ref 0–2)
BENZODIAZ UR QL SCN: POSITIVE
BILIRUB SERPL-MCNC: 0.3 MG/DL (ref 0.2–1.2)
BILIRUB UR QL STRIP: NEGATIVE
BUN BLD-MCNC: 8 MG/DL (ref 6–20)
BUN/CREAT SERPL: 12.9 (ref 7–25)
BUPRENORPHINE SERPL-MCNC: NEGATIVE NG/ML
CALCIUM SPEC-SCNC: 9.4 MG/DL (ref 8.6–10.5)
CANNABINOIDS SERPL QL: NEGATIVE
CHLORIDE SERPL-SCNC: 102 MMOL/L (ref 98–107)
CLARITY UR: CLEAR
CO2 SERPL-SCNC: 23.5 MMOL/L (ref 22–29)
COCAINE UR QL: NEGATIVE
COLOR UR: YELLOW
CREAT BLD-MCNC: 0.62 MG/DL (ref 0.76–1.27)
DEPRECATED RDW RBC AUTO: 43.7 FL (ref 37–54)
EOSINOPHIL # BLD AUTO: 0.49 10*3/MM3 (ref 0.1–0.3)
EOSINOPHIL NFR BLD AUTO: 3.5 % (ref 0–4)
ERYTHROCYTE [DISTWIDTH] IN BLOOD BY AUTOMATED COUNT: 13.2 % (ref 11.5–14.5)
GFR SERPL CREATININE-BSD FRML MDRD: >150 ML/MIN/1.73
GLOBULIN UR ELPH-MCNC: 2.3 GM/DL
GLUCOSE BLD-MCNC: 109 MG/DL (ref 65–99)
GLUCOSE UR STRIP-MCNC: NEGATIVE MG/DL
HCT VFR BLD AUTO: 42.2 % (ref 42–52)
HGB BLD-MCNC: 14.7 G/DL (ref 14–18)
HGB UR QL STRIP.AUTO: NEGATIVE
IMM GRANULOCYTES # BLD: 0.04 10*3/MM3 (ref 0–0.03)
IMM GRANULOCYTES NFR BLD: 0.3 % (ref 0–0.5)
KETONES UR QL STRIP: NEGATIVE
LEUKOCYTE ESTERASE UR QL STRIP.AUTO: NEGATIVE
LIPASE SERPL-CCNC: 19 U/L (ref 13–60)
LYMPHOCYTES # BLD AUTO: 4.82 10*3/MM3 (ref 0.6–4.8)
LYMPHOCYTES NFR BLD AUTO: 34.8 % (ref 20–45)
MCH RBC QN AUTO: 31.2 PG (ref 27–31)
MCHC RBC AUTO-ENTMCNC: 34.8 G/DL (ref 31–37)
MCV RBC AUTO: 89.6 FL (ref 80–94)
METHADONE UR QL SCN: NEGATIVE
MONOCYTES # BLD AUTO: 1.09 10*3/MM3 (ref 0–1)
MONOCYTES NFR BLD AUTO: 7.9 % (ref 3–8)
NEUTROPHILS # BLD AUTO: 7.35 10*3/MM3 (ref 1.5–8.3)
NEUTROPHILS NFR BLD AUTO: 53.1 % (ref 45–70)
NITRITE UR QL STRIP: NEGATIVE
NRBC BLD MANUAL-RTO: 0 /100 WBC (ref 0–0)
OPIATES UR QL: NEGATIVE
OXYCODONE UR QL SCN: NEGATIVE
PCP UR QL SCN: NEGATIVE
PH UR STRIP.AUTO: 7 [PH] (ref 4.5–8)
PLATELET # BLD AUTO: 448 10*3/MM3 (ref 140–500)
PMV BLD AUTO: 10.3 FL (ref 7.4–10.4)
POTASSIUM BLD-SCNC: 3.7 MMOL/L (ref 3.5–5.2)
PROPOXYPH UR QL: NEGATIVE
PROT SERPL-MCNC: 7.1 G/DL (ref 6–8.5)
PROT UR QL STRIP: NEGATIVE
RBC # BLD AUTO: 4.71 10*6/MM3 (ref 4.7–6.1)
SODIUM BLD-SCNC: 140 MMOL/L (ref 136–145)
SP GR UR STRIP: 1.01 (ref 1–1.03)
TRICYCLICS UR QL SCN: NEGATIVE
TROPONIN T SERPL-MCNC: <0.01 NG/ML (ref 0–0.03)
UROBILINOGEN UR QL STRIP: NORMAL
WBC NRBC COR # BLD: 13.84 10*3/MM3 (ref 4.8–10.8)

## 2017-07-14 PROCEDURE — 74177 CT ABD & PELVIS W/CONTRAST: CPT

## 2017-07-14 PROCEDURE — 96374 THER/PROPH/DIAG INJ IV PUSH: CPT

## 2017-07-14 PROCEDURE — 93010 ELECTROCARDIOGRAM REPORT: CPT | Performed by: INTERNAL MEDICINE

## 2017-07-14 PROCEDURE — 93005 ELECTROCARDIOGRAM TRACING: CPT | Performed by: EMERGENCY MEDICINE

## 2017-07-14 PROCEDURE — 84484 ASSAY OF TROPONIN QUANT: CPT | Performed by: EMERGENCY MEDICINE

## 2017-07-14 PROCEDURE — 99283 EMERGENCY DEPT VISIT LOW MDM: CPT

## 2017-07-14 PROCEDURE — 99284 EMERGENCY DEPT VISIT MOD MDM: CPT | Performed by: EMERGENCY MEDICINE

## 2017-07-14 PROCEDURE — 83690 ASSAY OF LIPASE: CPT | Performed by: EMERGENCY MEDICINE

## 2017-07-14 PROCEDURE — 96361 HYDRATE IV INFUSION ADD-ON: CPT

## 2017-07-14 PROCEDURE — 96375 TX/PRO/DX INJ NEW DRUG ADDON: CPT

## 2017-07-14 PROCEDURE — 25010000002 ONDANSETRON PER 1 MG: Performed by: EMERGENCY MEDICINE

## 2017-07-14 PROCEDURE — 81003 URINALYSIS AUTO W/O SCOPE: CPT | Performed by: EMERGENCY MEDICINE

## 2017-07-14 PROCEDURE — 85025 COMPLETE CBC W/AUTO DIFF WBC: CPT | Performed by: EMERGENCY MEDICINE

## 2017-07-14 PROCEDURE — 80306 DRUG TEST PRSMV INSTRMNT: CPT | Performed by: EMERGENCY MEDICINE

## 2017-07-14 PROCEDURE — 82150 ASSAY OF AMYLASE: CPT | Performed by: EMERGENCY MEDICINE

## 2017-07-14 PROCEDURE — 0 IOPAMIDOL PER 1 ML: Performed by: EMERGENCY MEDICINE

## 2017-07-14 PROCEDURE — 80053 COMPREHEN METABOLIC PANEL: CPT | Performed by: EMERGENCY MEDICINE

## 2017-07-14 PROCEDURE — 25010000002 FENTANYL CITRATE (PF) 100 MCG/2ML SOLUTION: Performed by: EMERGENCY MEDICINE

## 2017-07-14 RX ORDER — SODIUM CHLORIDE 0.9 % (FLUSH) 0.9 %
10 SYRINGE (ML) INJECTION AS NEEDED
Status: DISCONTINUED | OUTPATIENT
Start: 2017-07-14 | End: 2017-07-14 | Stop reason: HOSPADM

## 2017-07-14 RX ORDER — HYDROCODONE BITARTRATE AND ACETAMINOPHEN 5; 325 MG/1; MG/1
1 TABLET ORAL 2 TIMES DAILY
COMMUNITY
End: 2018-09-13

## 2017-07-14 RX ORDER — FENTANYL CITRATE 50 UG/ML
75 INJECTION, SOLUTION INTRAMUSCULAR; INTRAVENOUS ONCE
Status: COMPLETED | OUTPATIENT
Start: 2017-07-14 | End: 2017-07-14

## 2017-07-14 RX ORDER — ONDANSETRON 2 MG/ML
4 INJECTION INTRAMUSCULAR; INTRAVENOUS ONCE
Status: COMPLETED | OUTPATIENT
Start: 2017-07-14 | End: 2017-07-14

## 2017-07-14 RX ADMIN — IOPAMIDOL 100 ML: 755 INJECTION, SOLUTION INTRAVENOUS at 04:18

## 2017-07-14 RX ADMIN — ONDANSETRON 4 MG: 2 INJECTION, SOLUTION INTRAMUSCULAR; INTRAVENOUS at 02:58

## 2017-07-14 RX ADMIN — SODIUM CHLORIDE 1000 ML: 9 INJECTION, SOLUTION INTRAVENOUS at 02:41

## 2017-07-14 RX ADMIN — FENTANYL CITRATE 75 MCG: 50 INJECTION, SOLUTION INTRAMUSCULAR; INTRAVENOUS at 03:00

## 2017-07-14 NOTE — ED NOTES
Patient rang call light and asked for pain meds. States pain has returned. M.d aware Patient will go to CT AT 8997.     Aspen Romano RN  07/14/17 8205
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
## 2017-07-14 NOTE — ED PROVIDER NOTES
"Subjective     History provided by:  Patient and medical records    History of Present Illness    · Chief complaint: Abdominal pain    · Location: Mid right abdomen that does not radiate    · Quality/Severity: Severe sharp pain that is constant    · Timing/Onset: Pain started about 7 PM (7 hours ago) is gotten progressively worse.    · Modifying Factors: Movement and walking exacerbates pain    · Associated symptoms: He denies associated nausea or vomiting or fever    · Narrative: The patient is a 22-year-old white male complaining of right mid abdominal pain that started at 7 PM this past evening has gotten progressively worse.  He states that movement such as walking and standing up straight exacerbates the pain.  He reports a history of having the pain numerous times before as been evaluated at [facility] for it before.  He states he was told that he had an infection \"next to my liver\".  Review of the records from [facility] I can only find where he was diagnosed with gastritis.  There is no significant past knowledge he found in any of his numerous visits to the emergency departments for abdominal pain.  He's been worked up numerous times with an abdominal CT that showed no abnormalities of his liver or pancreas or spleen.  The CTs did show nonobstructing renal stones.  He also had a normal MRCP in March 2016.  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    ED Triage Vitals   Temp Heart Rate Resp BP SpO2   07/14/17 0210 07/14/17 0210 07/14/17 0210 07/14/17 0210 07/14/17 0210   98.2 °F (36.8 °C) 140 14 131/85 100 %      Temp src Heart Rate Source Patient Position BP Location FiO2 (%)   07/14/17 0210 -- 07/14/17 0210 07/14/17 0210 --   Oral  Lying Right arm        Review of Systems   Constitutional: Negative for activity change, appetite change, chills, diaphoresis, fatigue and fever. "   HENT: Negative for congestion, dental problem, ear pain, hearing loss, mouth sores, postnasal drip, rhinorrhea, sinus pressure, sore throat and voice change.    Eyes: Negative for photophobia, pain, discharge, redness and visual disturbance.   Respiratory: Negative for cough, chest tightness, shortness of breath, wheezing and stridor.    Cardiovascular: Negative for chest pain, palpitations and leg swelling.   Gastrointestinal: Positive for abdominal pain. Negative for diarrhea, nausea and vomiting.   Genitourinary: Negative for difficulty urinating, dysuria, flank pain, frequency, hematuria and urgency.   Musculoskeletal: Negative for arthralgias, back pain, gait problem, joint swelling, myalgias, neck pain and neck stiffness.   Skin: Negative for color change and rash.   Neurological: Negative for dizziness, tremors, seizures, syncope, facial asymmetry, speech difficulty, weakness, light-headedness, numbness and headaches.   Hematological: Negative for adenopathy.   Psychiatric/Behavioral: Positive for sleep disturbance. Negative for confusion and decreased concentration. The patient is nervous/anxious.        Past Medical History:   Diagnosis Date   • ADD (attention deficit disorder)    • Anxiety    • Arthritis    • Chronic pain    • Headache    • Lumbar back pain    • Tobacco abuse counseling        Allergies   Allergen Reactions   • Amoxicillin-Pot Clavulanate    • Cefaclor    • Levofloxacin Itching and Rash   • Penicillins Rash       Past Surgical History:   Procedure Laterality Date   • FEMUR FRACTURE SURGERY  06/2009   • HIP SURGERY Left 10/2014   • HIP SURGERY Left 06/2009       Family History   Problem Relation Age of Onset   • Diabetes Mother    • Fibromyalgia Mother    • Heart disease Mother    • Hypertension Mother    • Cervical cancer Mother    • Hypertension Father    • No Known Problems Sister    • No Known Problems Brother    • No Known Problems Sister    • No Known Problems Sister    • No Known  Problems Sister        Social History     Social History   • Marital status: Single     Spouse name: N/A   • Number of children: N/A   • Years of education: N/A     Occupational History   • Student    •       Social History Main Topics   • Smoking status: Current Every Day Smoker     Packs/day: 1.00     Years: 8.00     Types: Cigarettes   • Smokeless tobacco: Never Used      Comment: Patient states he would love to quit.   • Alcohol use Yes      Comment: Social   • Drug use: No   • Sexual activity: Defer     Other Topics Concern   • None     Social History Narrative           Objective   Physical Exam   Constitutional: He is oriented to person, place, and time. He appears well-developed and well-nourished. He appears distressed.   The patient does not appear toxic.  He displays some signs of being in discomfort, but his complaint of pain is out of proportion to exam.   HENT:   Head: Normocephalic and atraumatic.   Nose: Nose normal.   Mouth/Throat: Oropharynx is clear and moist. No oropharyngeal exudate.   Eyes: Conjunctivae and EOM are normal. Pupils are equal, round, and reactive to light. Right eye exhibits no discharge. Left eye exhibits no discharge. No scleral icterus.   Neck: Normal range of motion. Neck supple. No JVD present. No thyromegaly present.   Cardiovascular: Regular rhythm.    No murmur heard.  His heart sounds are tachycardic.   Pulmonary/Chest: Effort normal and breath sounds normal. He has no wheezes. He has no rales. He exhibits no tenderness.   Abdominal: Soft. Bowel sounds are normal. He exhibits no distension. There is tenderness.   The patient's abdomen is soft.  He has mid right abdomen tenderness to deep palpation with minimal rebound.  He also reports for for tenderness to the mid right abdomen when other quadrants the abdomen are palpated.  He has no guarding.  His psoas and obturator signs are negative.   Musculoskeletal: Normal range of motion. He exhibits no edema, tenderness  or deformity.   Lymphadenopathy:     He has no cervical adenopathy.   Neurological: He is alert and oriented to person, place, and time. No cranial nerve deficit. Coordination normal.   No focal motor sensory deficit   Skin: Skin is warm and dry. No rash noted. He is not diaphoretic.   Psychiatric: He has a normal mood and affect. His behavior is normal. Judgment and thought content normal.   Nursing note and vitals reviewed.      ECG 12 Lead    Date/Time: 7/14/2017 2:21 AM  Performed by: LEIGHTON KING.  Authorized by: LEIGHTON KING.   Comments: EKG performed 02:21, EKG read 02:22.  Sinus tachycardia with a rate of 126, normal axis, inverted P wave in V1 consistent with probable left atrial abnormality, no acute ST segment elevation or depression consistent with ischemia, no ectopy, normal conduction, normal R-wave progression, normal IL and QT intervals.               ED Course  ED Course   Comment By Time   [facility] Report [id number] is 3 pages of controlled substances, primarily narcotics, he apparently fills approximately 60 hydrocodone 5 mg monthly, last filled July 6. Leighton King MD 07/14 0241   IVFs - NS one liter bolus.  Fentanyl 75mcg IV Leighton King MD 07/14 0241   02:53  patient now complaining of nausea - Zofran 4mg IV Leighton King MD 07/14 0256   03:35  the patient's sinus tachycardia has resolved and he currently is in sinus rhythm with a rate of 85. Leighton King MD 07/14 0315   04:47  and inform the patient of his normal CT result as well as other lab results.  I can find no identifiable pathology to explain the patient's pain, therefore I cannot justify administering additional pain medication at this time.  I'll be discharging him home to follow-up with his PCP. Leighton King MD 07/14 9430   05:12 the patient is discharged and stated that he was waiting for his mother to pick him up, but then got in his car and drove off in spite of being told that he was under the influence of narcotic  medications and not to drive.  The police were called by nurse Romano. Leighton Casanova MD 07/14 0512                  [facility]  Number of Diagnoses or Management Options  Right sided abdominal pain: new and requires workup     Amount and/or Complexity of Data Reviewed  Clinical lab tests: ordered and reviewed  Tests in the radiology section of CPT®: ordered and reviewed  Review and summarize past medical records: yes  Independent visualization of images, tracings, or specimens: yes    Risk of Complications, Morbidity, and/or Mortality  Presenting problems: high  Diagnostic procedures: high  Management options: high  General comments: My differential diagnosis for abdominal pain includes but is not limited to:  Gastritis, gastroenteritis, peptic ulcer disease, GERD, esophageal perforation, acute appendicitis, mesenteric adenitis, Meckel’s diverticulum, epiploic appendagitis, diverticulitis, colon cancer, ulcerative colitis, Crohn’s disease, intussusception, small bowel obstruction, adhesions, ischemic bowel, perforated viscus, ileus, obstipation, biliary colic, cholecystitis, cholelithiasis, Andrez-Mason Cresencio, hepatitis, pancreatitis, common bile duct obstruction, cholangitis, bile leak, splenic trauma, splenic rupture, splenic infarction, splenic abscess, abdominal abscess, ascites, spontaneous bacterial peritonitis, hernia, UTI, cystitis, prostatitis, ureterolithiasis, urinary obstruction, ovarian cyst, torsion, pregnancy, ectopic pregnancy, PID, pelvic abscess, mittelschmerz, endometriosis, AAA, myocardial infarction, pneumonia, cancer, porphyria, DKA, medications, sickle cell, viral syndrome, zoster    Patient Progress  Patient progress: stable      Final diagnoses:   Right sided abdominal pain           Labs Reviewed   COMPREHENSIVE METABOLIC PANEL - Abnormal; Notable for the following:        Result Value    Glucose 109 (*)     Creatinine 0.62 (*)     All other components within normal limits   URINE DRUG SCREEN -  Abnormal; Notable for the following:     Benzodiazepine Screen, Urine Positive (*)     All other components within normal limits    Narrative:     Urine drug screen results are to be used for medical purposes only.  They are not to be used for legal purposes such as employment testing.  Negative results do not necessarily mean the complete absence of a subtance, but rather that the result is less than the cutoff for that substance.  Positive results are unconfirmed and considered Preliminary Positive.  [facility] does not automatically confirm Postitive Unconfirmed results.  The physician may request (order) an Unconfirmed Positive result to be sent out for confirmation.      Negative Thresholds for Drugs Screened:    THC screen, urine                          50 ng/ml  Phenycyclidine (PCP), urine                25 ng/ml  Cocaine screen, urine                     150 ng/ml  Methamphetamine, urine                    500 ng/ml  Opiate screen, urine                      100 ng/ml  Amphetamine screen, urine                 500 ng/ml  Benzodiazepine screen, urine              150 ng/ml  Tricyclic Antidepressants screen, urine   300 ng/ml  Methadone screen, urine                   200 ng/ml  Barbiturates screen, urine                200 ng/ml  Oxycodone screen, urine                   100 ng/ml  Propoxyphene screen, urine                300 ng/ml  Buprenorphine screen, urine                10 ng/ml   CBC WITH AUTO DIFFERENTIAL - Abnormal; Notable for the following:     WBC 13.84 (*)     MCH 31.2 (*)     Lymphocytes, Absolute 4.82 (*)     Monocytes, Absolute 1.09 (*)     Eosinophils, Absolute 0.49 (*)     Immature Grans, Absolute 0.04 (*)     All other components within normal limits   AMYLASE - Normal   LIPASE - Normal   URINALYSIS W/ CULTURE IF INDICATED - Normal    Narrative:     Urine microscopic not indicated.   TROPONIN (IN-HOUSE) - Normal    Narrative:     Troponin T Reference Ranges:  Less than 0.03  ng/mL:    Negative for AMI  0.03 to 0.09 ng/mL:      Indeterminant for AMI  Greater than 0.09 ng/mL: Positive for AMI   CBC AND DIFFERENTIAL    Narrative:     The following orders were created for panel order CBC & Differential.  Procedure                               Abnormality         Status                     ---------                               -----------         ------                     CBC Auto Differential[[id number]]        Abnormal            Final result                 Please view results for these tests on the individual orders.     CT Abdomen Pelvis With Contrast   ED Interpretation   Normal appendix.  No change in 2 mm right renal stone.  No   hydronephrosis.  Study is otherwise normal.  Per Dr. Kimbrough             Medication List      Stop          amphetamine-dextroamphetamine 20 MG tablet   Commonly known as:  ADDERALL                Leighton Casanova MD  07/14/17 0665

## 2017-11-07 ENCOUNTER — OFFICE VISIT (OUTPATIENT)
Dept: FAMILY MEDICINE CLINIC | Facility: CLINIC | Age: 22
End: 2017-11-07

## 2017-11-07 VITALS
OXYGEN SATURATION: 99 % | WEIGHT: 151 LBS | TEMPERATURE: 98 F | BODY MASS INDEX: 21.62 KG/M2 | DIASTOLIC BLOOD PRESSURE: 80 MMHG | SYSTOLIC BLOOD PRESSURE: 118 MMHG | HEIGHT: 70 IN | HEART RATE: 110 BPM

## 2017-11-07 DIAGNOSIS — G89.21 CHRONIC PAIN DUE TO INJURY: Primary | ICD-10-CM

## 2017-11-07 PROCEDURE — 99212 OFFICE O/P EST SF 10 MIN: CPT | Performed by: FAMILY MEDICINE

## 2017-11-07 RX ORDER — ACETAMINOPHEN AND CODEINE PHOSPHATE 300; 30 MG/1; MG/1
TABLET ORAL
Refills: 0 | COMMUNITY
Start: 2017-10-27 | End: 2018-09-13

## 2017-11-07 NOTE — PROGRESS NOTES
Subjective   Mauro Epstein II is a 22 y.o. male who is here for   Chief Complaint   Patient presents with   • Leg Pain     left leg, referral for pain   .     History of Present Illness   Is unhappy with [facility]  We reviewed his notes from [facility]  He already has a referral to a new pain management group made by Itfikhar mireles during 09/20/2017 office there  Nurse gave him this info.  He did not need to see me otherwise.    The following portions of the patient's history were reviewed and updated as appropriate: allergies, current medications, past family history, past medical history, past social history, past surgical history and problem list.    Review of Systems    Objective   Physical Exam    Assessment/Plan   Mauro Chase was seen today for leg pain.    Diagnoses and all orders for this visit:    Chronic pain due to injury      Patient Instructions   MRI lumbar spine with and without contrast,    09/09/2017        HISTORY: low back pain, epidural injection yesterday, bladder incontinence.         TECHNIQUE: Multiplanar multisequence imaging was obtained of the lumbar spine. Images were obtained with and without IV gadolinium.     COMPARISON: None available.    FINDINGS:    Conus terminates at T12-L1. Normal signal within the included cord. No evidence for an epidural process such as abscess. No abnormal enhancement is seen. Marrow signal within the lumbar vertebrae is normal. Minimal disc space narrowing at L5-S1. No   significant central canal or neural foraminal stenosis. Paravertebral soft tissues are normal.    IMPRESSION:  No acute abnormality.    Dictated by: Rossy Soares M.D.    Images and Report reviewed and interpreted by: Rossy Soares M.D.      There are no discontinued medications.     Return if symptoms worsen or fail to improve.    Dr. Gideon Foley  [place], [place]

## 2017-11-07 NOTE — PATIENT INSTRUCTIONS
MRI lumbar spine with and without contrast,    09/09/2017        HISTORY: low back pain, epidural injection yesterday, bladder incontinence.         TECHNIQUE: Multiplanar multisequence imaging was obtained of the lumbar spine. Images were obtained with and without IV gadolinium.     COMPARISON: None available.    FINDINGS:    Conus terminates at T12-L1. Normal signal within the included cord. No evidence for an epidural process such as abscess. No abnormal enhancement is seen. Marrow signal within the lumbar vertebrae is normal. Minimal disc space narrowing at L5-S1. No   significant central canal or neural foraminal stenosis. Paravertebral soft tissues are normal.    IMPRESSION:  No acute abnormality.    Dictated by: Rossy Soares M.D.    Images and Report reviewed and interpreted by: Rossy Soares M.D.

## 2018-09-13 ENCOUNTER — OFFICE VISIT (OUTPATIENT)
Dept: FAMILY MEDICINE CLINIC | Facility: CLINIC | Age: 23
End: 2018-09-13

## 2018-09-13 VITALS
HEIGHT: 70 IN | BODY MASS INDEX: 20.76 KG/M2 | SYSTOLIC BLOOD PRESSURE: 120 MMHG | OXYGEN SATURATION: 99 % | HEART RATE: 88 BPM | DIASTOLIC BLOOD PRESSURE: 70 MMHG | WEIGHT: 145 LBS

## 2018-09-13 DIAGNOSIS — K80.20 GALLBLADDER COLIC: ICD-10-CM

## 2018-09-13 DIAGNOSIS — N20.0 KIDNEY STONES: Primary | ICD-10-CM

## 2018-09-13 DIAGNOSIS — Z09 HOSPITAL DISCHARGE FOLLOW-UP: ICD-10-CM

## 2018-09-13 PROCEDURE — 99214 OFFICE O/P EST MOD 30 MIN: CPT | Performed by: FAMILY MEDICINE

## 2018-09-13 RX ORDER — ONDANSETRON HYDROCHLORIDE 8 MG/1
8 TABLET, FILM COATED ORAL EVERY 8 HOURS PRN
Qty: 30 TABLET | Refills: 1 | Status: SHIPPED | OUTPATIENT
Start: 2018-09-13 | End: 2018-09-23

## 2018-09-13 RX ORDER — HYDROCODONE BITARTRATE AND ACETAMINOPHEN 5; 325 MG/1; MG/1
1 TABLET ORAL EVERY 4 HOURS PRN
Qty: 18 TABLET | Refills: 0 | Status: SHIPPED | OUTPATIENT
Start: 2018-09-13 | End: 2018-09-23

## 2018-09-13 NOTE — PROGRESS NOTES
Subjective   Mauro Epstein II is a 23 y.o. male who is here for   Chief Complaint   Patient presents with   • Follow-up     Hospital    • Abdominal Pain   .   Reviewed all his scanned in medical records from [facility].    History of Present Illness   Abdominal Pain: Patient complains of abdominal pain. The pain is described as colicky, and is 10/10 in intensity. Pain is located in the RUQ without radiation. Onset was a few weeks ago. Symptoms have been unchanged since. Aggravating factors: eating.  Alleviating factors: belching and bowel movements. Associated symptoms: nausea. The patient denies hematochezia and hematuria.    Went to [facility] ER and was admitted  Had blood in urine. Has a free floating stone in left kidney, so may have already passed one  Has a f/u with dr MICKEY monroe, that i will facilitate.    Concern over his GB  Both CT and US of GB were both -  Will proceed with HIDA, if + will refer to dr Clarke.         The following portions of the patient's history were reviewed and updated as appropriate: allergies, current medications, past family history, past medical history, past social history, past surgical history and problem list.    Review of Systems   Constitutional: Positive for fatigue and unexpected weight change.   HENT: Negative.    Respiratory: Negative.    Cardiovascular: Negative.    Gastrointestinal: Positive for abdominal pain, nausea and vomiting. Negative for anal bleeding, blood in stool, constipation and diarrhea.   Genitourinary: Positive for flank pain. Negative for hematuria.   Skin: Negative for rash.   Psychiatric/Behavioral: Positive for dysphoric mood.       Objective   Physical Exam   Constitutional: He appears cachectic. He has a sickly appearance.   HENT:   Mouth/Throat: Oropharynx is clear and moist.   Eyes: Conjunctivae are normal.   Neck: Normal range of motion.   Cardiovascular: Normal rate.    Pulmonary/Chest: Effort normal.   Neurological: He is alert.   Nursing  note and vitals reviewed.      Assessment/Plan   Mauro Chase was seen today for follow-up and abdominal pain.    Diagnoses and all orders for this visit:    Kidney stones  -     Ambulatory Referral to Urology  -     HYDROcodone-acetaminophen (NORCO) 5-325 MG per tablet; Take 1 tablet by mouth Every 4 (Four) Hours As Needed for Severe Pain .  -     ondansetron (ZOFRAN) 8 MG tablet; Take 1 tablet by mouth Every 8 (Eight) Hours As Needed for Nausea or Vomiting.    Gallbladder colic  -     NM HIDA scan with pharmacological intervention; Future  -     HYDROcodone-acetaminophen (NORCO) 5-325 MG per tablet; Take 1 tablet by mouth Every 4 (Four) Hours As Needed for Severe Pain .  -     ondansetron (ZOFRAN) 8 MG tablet; Take 1 tablet by mouth Every 8 (Eight) Hours As Needed for Nausea or Vomiting.    Hospital discharge follow-up      Patient Instructions   Patient has been prescribed controlled medications.  Treatment discussed  BRIGHT updated and reviewed.  Risk and Benefits discussed.  Per [facility].      Medications Discontinued During This Encounter   Medication Reason   • acetaminophen-codeine (TYLENOL #3) 300-30 MG per tablet *Therapy completed   • HYDROcodone-acetaminophen (NORCO) 5-325 MG per tablet *Therapy completed   • LORazepam (ATIVAN) 2 MG tablet *Therapy completed        Return if symptoms worsen or fail to improve.    Dr. Gideon Foley  [place], [place]

## 2018-09-14 PROBLEM — Z09 HOSPITAL DISCHARGE FOLLOW-UP: Status: ACTIVE | Noted: 2018-09-14

## 2018-09-14 NOTE — PATIENT INSTRUCTIONS
Patient has been prescribed controlled medications.  Treatment discussed  BRIGHT updated and reviewed.  Risk and Benefits discussed.  Per [place].

## 2018-09-17 DIAGNOSIS — K80.20 GALLBLADDER COLIC: ICD-10-CM

## 2018-09-17 DIAGNOSIS — N20.0 KIDNEY STONES: ICD-10-CM

## 2018-09-17 RX ORDER — PROMETHAZINE HYDROCHLORIDE 25 MG/1
25 TABLET ORAL EVERY 6 HOURS PRN
Qty: 30 TABLET | Refills: 0 | Status: SHIPPED | OUTPATIENT
Start: 2018-09-17 | End: 2018-09-23

## 2018-09-17 RX ORDER — HYDROCODONE BITARTRATE AND ACETAMINOPHEN 5; 325 MG/1; MG/1
1 TABLET ORAL EVERY 4 HOURS PRN
Qty: 18 TABLET | Refills: 0 | OUTPATIENT
Start: 2018-09-17

## 2018-09-17 NOTE — TELEPHONE ENCOUNTER
Pt called back and said he is in a lot of pain. He said he can not go to work in this much pain. He did go to the urologist today and they are going to scope him.

## 2018-09-17 NOTE — TELEPHONE ENCOUNTER
Last OV: 09/13/2018  Next OV:   Last RF: 09/13/2018  # 18        0rfs  Christiano: done today     Pt would like also like an alternative to Zofran called in.

## 2018-09-23 ENCOUNTER — APPOINTMENT (OUTPATIENT)
Dept: CT IMAGING | Facility: HOSPITAL | Age: 23
End: 2018-09-23

## 2018-09-23 ENCOUNTER — HOSPITAL ENCOUNTER (EMERGENCY)
Facility: HOSPITAL | Age: 23
Discharge: HOME | End: 2018-09-23
Attending: EMERGENCY MEDICINE | Admitting: EMERGENCY MEDICINE

## 2018-09-23 VITALS
HEART RATE: 66 BPM | WEIGHT: 145 LBS | TEMPERATURE: 97.6 F | HEIGHT: 70 IN | SYSTOLIC BLOOD PRESSURE: 111 MMHG | BODY MASS INDEX: 20.76 KG/M2 | DIASTOLIC BLOOD PRESSURE: 74 MMHG | OXYGEN SATURATION: 96 % | RESPIRATION RATE: 18 BRPM

## 2018-09-23 DIAGNOSIS — N20.1 LEFT URETERAL STONE: Primary | ICD-10-CM

## 2018-09-23 LAB
ALBUMIN SERPL-MCNC: 4.7 G/DL (ref 3.5–5.2)
ALBUMIN/GLOB SERPL: 1.9 G/DL
ALP SERPL-CCNC: 93 U/L (ref 40–129)
ALT SERPL W P-5'-P-CCNC: 10 U/L (ref 5–41)
ANION GAP SERPL CALCULATED.3IONS-SCNC: 14.4 MMOL/L
AST SERPL-CCNC: 13 U/L (ref 5–40)
BACTERIA UR QL AUTO: ABNORMAL /HPF
BASOPHILS # BLD AUTO: 0.07 10*3/MM3 (ref 0–0.2)
BASOPHILS NFR BLD AUTO: 0.4 % (ref 0–2)
BILIRUB SERPL-MCNC: 0.4 MG/DL (ref 0.2–1.2)
BILIRUB UR QL STRIP: NEGATIVE
BUN BLD-MCNC: 12 MG/DL (ref 6–20)
BUN/CREAT SERPL: 14.8 (ref 7–25)
CALCIUM SPEC-SCNC: 9.5 MG/DL (ref 8.6–10.5)
CHLORIDE SERPL-SCNC: 102 MMOL/L (ref 98–107)
CLARITY UR: ABNORMAL
CO2 SERPL-SCNC: 24.6 MMOL/L (ref 22–29)
COLOR UR: ABNORMAL
CREAT BLD-MCNC: 0.81 MG/DL (ref 0.76–1.27)
DEPRECATED RDW RBC AUTO: 46.5 FL (ref 37–54)
EOSINOPHIL # BLD AUTO: 0.13 10*3/MM3 (ref 0.1–0.3)
EOSINOPHIL NFR BLD AUTO: 0.7 % (ref 0–4)
ERYTHROCYTE [DISTWIDTH] IN BLOOD BY AUTOMATED COUNT: 13.6 % (ref 11.5–14.5)
GFR SERPL CREATININE-BSD FRML MDRD: 116 ML/MIN/1.73
GLOBULIN UR ELPH-MCNC: 2.5 GM/DL
GLUCOSE BLD-MCNC: 123 MG/DL (ref 65–99)
GLUCOSE UR STRIP-MCNC: NEGATIVE MG/DL
HCT VFR BLD AUTO: 44.2 % (ref 42–52)
HGB BLD-MCNC: 14.8 G/DL (ref 14–18)
HGB UR QL STRIP.AUTO: ABNORMAL
HYALINE CASTS UR QL AUTO: ABNORMAL /LPF
IMM GRANULOCYTES # BLD: 0.07 10*3/MM3 (ref 0–0.03)
IMM GRANULOCYTES NFR BLD: 0.4 % (ref 0–0.5)
KETONES UR QL STRIP: NEGATIVE
LEUKOCYTE ESTERASE UR QL STRIP.AUTO: NEGATIVE
LYMPHOCYTES # BLD AUTO: 4.41 10*3/MM3 (ref 0.6–4.8)
LYMPHOCYTES NFR BLD AUTO: 23.1 % (ref 20–45)
MCH RBC QN AUTO: 30.9 PG (ref 27–31)
MCHC RBC AUTO-ENTMCNC: 33.5 G/DL (ref 31–37)
MCV RBC AUTO: 92.3 FL (ref 80–94)
MONOCYTES # BLD AUTO: 1.18 10*3/MM3 (ref 0–1)
MONOCYTES NFR BLD AUTO: 6.2 % (ref 3–8)
NEUTROPHILS # BLD AUTO: 13.22 10*3/MM3 (ref 1.5–8.3)
NEUTROPHILS NFR BLD AUTO: 69.2 % (ref 45–70)
NITRITE UR QL STRIP: NEGATIVE
NRBC BLD MANUAL-RTO: 0 /100 WBC (ref 0–0)
PH UR STRIP.AUTO: 6 [PH] (ref 4.5–8)
PLATELET # BLD AUTO: 478 10*3/MM3 (ref 140–500)
PMV BLD AUTO: 9.6 FL (ref 7.4–10.4)
POTASSIUM BLD-SCNC: 4.2 MMOL/L (ref 3.5–5.2)
PROT SERPL-MCNC: 7.2 G/DL (ref 6–8.5)
PROT UR QL STRIP: ABNORMAL
RBC # BLD AUTO: 4.79 10*6/MM3 (ref 4.7–6.1)
RBC # UR: ABNORMAL /HPF
REF LAB TEST METHOD: ABNORMAL
SODIUM BLD-SCNC: 141 MMOL/L (ref 136–145)
SP GR UR STRIP: 1.02 (ref 1–1.03)
SQUAMOUS #/AREA URNS HPF: ABNORMAL /HPF
UROBILINOGEN UR QL STRIP: ABNORMAL
WBC NRBC COR # BLD: 19.08 10*3/MM3 (ref 4.8–10.8)
WBC UR QL AUTO: ABNORMAL /HPF

## 2018-09-23 PROCEDURE — 80053 COMPREHEN METABOLIC PANEL: CPT | Performed by: EMERGENCY MEDICINE

## 2018-09-23 PROCEDURE — 74176 CT ABD & PELVIS W/O CONTRAST: CPT

## 2018-09-23 PROCEDURE — 99284 EMERGENCY DEPT VISIT MOD MDM: CPT | Performed by: EMERGENCY MEDICINE

## 2018-09-23 PROCEDURE — 99284 EMERGENCY DEPT VISIT MOD MDM: CPT

## 2018-09-23 PROCEDURE — 87086 URINE CULTURE/COLONY COUNT: CPT | Performed by: EMERGENCY MEDICINE

## 2018-09-23 PROCEDURE — 96375 TX/PRO/DX INJ NEW DRUG ADDON: CPT

## 2018-09-23 PROCEDURE — 25010000002 ONDANSETRON PER 1 MG: Performed by: EMERGENCY MEDICINE

## 2018-09-23 PROCEDURE — 25010000002 HYDROMORPHONE PER 4 MG: Performed by: EMERGENCY MEDICINE

## 2018-09-23 PROCEDURE — 96374 THER/PROPH/DIAG INJ IV PUSH: CPT

## 2018-09-23 PROCEDURE — 25010000002 KETOROLAC TROMETHAMINE PER 15 MG: Performed by: EMERGENCY MEDICINE

## 2018-09-23 PROCEDURE — 81001 URINALYSIS AUTO W/SCOPE: CPT | Performed by: EMERGENCY MEDICINE

## 2018-09-23 PROCEDURE — 85025 COMPLETE CBC W/AUTO DIFF WBC: CPT | Performed by: EMERGENCY MEDICINE

## 2018-09-23 RX ORDER — ONDANSETRON 4 MG/1
8 TABLET, ORALLY DISINTEGRATING ORAL ONCE
Status: COMPLETED | OUTPATIENT
Start: 2018-09-23 | End: 2018-09-23

## 2018-09-23 RX ORDER — NITROFURANTOIN 25; 75 MG/1; MG/1
100 CAPSULE ORAL 2 TIMES DAILY
Qty: 14 CAPSULE | Refills: 0 | Status: SHIPPED | OUTPATIENT
Start: 2018-09-23 | End: 2018-10-04

## 2018-09-23 RX ORDER — HYDROMORPHONE HCL 110MG/55ML
1 PATIENT CONTROLLED ANALGESIA SYRINGE INTRAVENOUS ONCE
Status: COMPLETED | OUTPATIENT
Start: 2018-09-23 | End: 2018-09-23

## 2018-09-23 RX ORDER — HYDROCODONE BITARTRATE AND ACETAMINOPHEN 5; 325 MG/1; MG/1
TABLET ORAL
Qty: 30 TABLET | Refills: 0 | Status: SHIPPED | OUTPATIENT
Start: 2018-09-23 | End: 2018-10-04

## 2018-09-23 RX ORDER — SODIUM CHLORIDE 0.9 % (FLUSH) 0.9 %
10 SYRINGE (ML) INJECTION AS NEEDED
Status: DISCONTINUED | OUTPATIENT
Start: 2018-09-23 | End: 2018-09-23 | Stop reason: HOSPADM

## 2018-09-23 RX ORDER — KETOROLAC TROMETHAMINE 30 MG/ML
30 INJECTION, SOLUTION INTRAMUSCULAR; INTRAVENOUS EVERY 6 HOURS PRN
Status: DISCONTINUED | OUTPATIENT
Start: 2018-09-23 | End: 2018-09-23 | Stop reason: HOSPADM

## 2018-09-23 RX ORDER — NITROFURANTOIN 25; 75 MG/1; MG/1
100 CAPSULE ORAL EVERY 12 HOURS SCHEDULED
Status: COMPLETED | OUTPATIENT
Start: 2018-09-23 | End: 2018-09-23

## 2018-09-23 RX ORDER — ONDANSETRON 2 MG/ML
4 INJECTION INTRAMUSCULAR; INTRAVENOUS ONCE
Status: COMPLETED | OUTPATIENT
Start: 2018-09-23 | End: 2018-09-23

## 2018-09-23 RX ORDER — HYDROCODONE BITARTRATE AND ACETAMINOPHEN 5; 325 MG/1; MG/1
1 TABLET ORAL ONCE
Status: DISCONTINUED | OUTPATIENT
Start: 2018-09-23 | End: 2018-09-23

## 2018-09-23 RX ORDER — NITROFURANTOIN 25; 75 MG/1; MG/1
100 CAPSULE ORAL EVERY 12 HOURS SCHEDULED
Status: DISCONTINUED | OUTPATIENT
Start: 2018-09-23 | End: 2018-09-23

## 2018-09-23 RX ORDER — ONDANSETRON 8 MG/1
8 TABLET, ORALLY DISINTEGRATING ORAL EVERY 8 HOURS PRN
Qty: 10 TABLET | Refills: 0 | OUTPATIENT
Start: 2018-09-23 | End: 2019-01-04

## 2018-09-23 RX ADMIN — NITROFURANTOIN (MONOHYDRATE/MACROCRYSTALS) 100 MG: 75; 25 CAPSULE ORAL at 13:17

## 2018-09-23 RX ADMIN — ONDANSETRON 8 MG: 4 TABLET, ORALLY DISINTEGRATING ORAL at 13:17

## 2018-09-23 RX ADMIN — ONDANSETRON 4 MG: 2 INJECTION INTRAMUSCULAR; INTRAVENOUS at 11:28

## 2018-09-23 RX ADMIN — KETOROLAC TROMETHAMINE 30 MG: 30 INJECTION, SOLUTION INTRAMUSCULAR at 11:29

## 2018-09-23 RX ADMIN — HYDROMORPHONE HYDROCHLORIDE 1 MG: 2 INJECTION, SOLUTION INTRAMUSCULAR; INTRAVENOUS; SUBCUTANEOUS at 12:40

## 2018-09-23 NOTE — ED PROVIDER NOTES
Subjective   History of Present Illness  History of Present Illness    Chief complaint: Side pain    Location: Left side, nonradiating    Quality/Severity:  10 over 10 at its worst, 10 over 10 currently, sharp    Timing/Onset/Duration: Acute onset upon awakening this morning    Modifying Factors: Nothing makes it better or worse    Associated Symptoms: The patient has had nausea and vomiting ×2, nonbloody, nonbilious.  The patient denies fever or chills.  No chest pain or shortness of breath.  No diarrhea or burning when he urinates.    Narrative: This 23-year-old white male presents with acute onset of left side pain today.  The patient has never had anything like this before.  He was at [facility] last week for upper abdominal pain, and diagnosed with gallbladder disease.  He states his pain is different.    PCP:  Og      Review of Systems   Constitutional: Negative for chills and fever.   HENT: Negative for ear pain and sore throat.    Eyes: Negative for discharge and redness.   Respiratory: Negative for cough, chest tightness and shortness of breath.    Cardiovascular: Negative for chest pain.   Gastrointestinal: Positive for abdominal pain, nausea and vomiting. Negative for diarrhea.   Genitourinary: Positive for flank pain. Negative for decreased urine volume and difficulty urinating.   Musculoskeletal: Negative for back pain.   Skin: Negative for rash.   Neurological: Negative for headaches.   Psychiatric/Behavioral: Negative.  Negative for confusion.        Medication List      ASK your doctor about these medications    HYDROcodone-acetaminophen 5-325 MG per tablet  Commonly known as:  NORCO  Take 1 tablet by mouth Every 4 (Four) Hours As Needed for Severe Pain .     ondansetron 8 MG tablet  Commonly known as:  ZOFRAN  Take 1 tablet by mouth Every 8 (Eight) Hours As Needed for Nausea or   Vomiting.     promethazine 25 MG tablet  Commonly known as:  PHENERGAN  Take 1 tablet by mouth Every 6  (Six) Hours As Needed for Nausea or   Vomiting.          Past Medical History:   Diagnosis Date   • ADD (attention deficit disorder)    • Anxiety    • Arthritis    • Chronic pain    • Headache    • Lumbar back pain    • Tobacco abuse counseling        Allergies   Allergen Reactions   • Amoxicillin    • Amoxicillin-Pot Clavulanate    • Cefaclor    • Levofloxacin Itching and Rash   • Penicillins Rash       Past Surgical History:   Procedure Laterality Date   • FEMUR FRACTURE SURGERY  06/2009   • HIP SURGERY Left 10/2014   • HIP SURGERY Left 06/2009       Family History   Problem Relation Age of Onset   • Diabetes Mother    • Fibromyalgia Mother    • Heart disease Mother    • Hypertension Mother    • Cervical cancer Mother    • Hypertension Father    • No Known Problems Sister    • No Known Problems Brother    • No Known Problems Sister    • No Known Problems Sister    • No Known Problems Sister        Social History     Social History   • Marital status: Single     Occupational History   • Student    •       Social History Main Topics   • Smoking status: Current Every Day Smoker     Packs/day: 1.00     Years: 8.00     Types: Cigarettes   • Smokeless tobacco: Never Used      Comment: Patient states he would love to quit.   • Alcohol use Yes      Comment: Social   • Drug use: No   • Sexual activity: Defer     Other Topics Concern   • Not on file           Objective   Physical Exam   Constitutional: He is oriented to person, place, and time. He appears well-developed and well-nourished. He appears distressed (The patient is writhing on the cart.).   ED Triage Vitals  Temp: n/a  Heart Rate: 76 (09/23/18 1117)  Resp: 20 (09/23/18 1117)  BP: 127/89 (09/23/18 1115)  SpO2: 100 % (09/23/18 1117)  Temp src: n/a  Heart Rate Source: Monitor (09/23/18 1117)  Patient Position: Lying (09/23/18 1115)  BP Location: Right arm (09/23/18 1115)  FiO2 (%): n/a    The patient's vitals were reviewed by me.  Unless otherwise noted they  are within normal limits.     HENT:   Head: Normocephalic and atraumatic.   Right Ear: External ear normal.   Left Ear: External ear normal.   Nose: Nose normal.   Mouth/Throat: Oropharynx is clear and moist. No oropharyngeal exudate.   Eyes: Pupils are equal, round, and reactive to light. Conjunctivae and EOM are normal. Right eye exhibits no discharge. Left eye exhibits no discharge. No scleral icterus.   Neck: Normal range of motion. Neck supple. No JVD present. No tracheal deviation present. No thyromegaly present.   Cardiovascular: Normal rate, regular rhythm, normal heart sounds and intact distal pulses.  Exam reveals no gallop and no friction rub.    No murmur heard.  Pulmonary/Chest: Effort normal and breath sounds normal. No stridor. No respiratory distress. He has no wheezes. He has no rales. He exhibits no tenderness.   Abdominal: Soft. Bowel sounds are normal. He exhibits no distension and no mass. There is no tenderness. There is no rebound and no guarding. No hernia.   Musculoskeletal: Normal range of motion. He exhibits no edema or deformity.   Lymphadenopathy:     He has no cervical adenopathy.   Neurological: He is alert and oriented to person, place, and time.   Skin: Skin is warm and dry. No rash noted. He is not diaphoretic. No erythema. No pallor.   Psychiatric: His behavior is normal.   Nursing note and vitals reviewed.      Procedures           ED Course  ED Course as of Sep 23 1227   Sun Sep 23, 2018   1205 The laboratory values were reviewed by me.  The urine microscopic shows large blood.  The absolute neutrophil count is 13.2.  The white blood cell count is 19,000.  The serum glucose is 123.  [RC]   1224 The urine microscopic shows 21-30 red blood cells, 0-2 white blood cells, etc. esterase negative nitrite negative.  [RC]      ED Course User Index  [RC] Casimiro Powell MD      12:27 PM, 09/23/18:  The patient was reassessed.  His vital signs were reviewed and are stable.  Rates his  pain is 7/10.  His nausea has resolved.  Abdominal exam: Soft, nontender, no masses, positive bowel sounds.  The patient is requesting something more for pain.    12:52 PM, 09/23/18:  I spoke with Dr. Gann, on call for urology.  He would like the patient to be discharged home on an antibiotic, Flomax, and something for pain.  His office will call the patient this week for follow-up.    12:53 PM, 09/23/18:  The patient's diagnosis of left ureteral stone was discussed with him.  I will write him a prescription for Norco, Flomax, Zofran, and Ceftin.  Patient should follow-up with Dr. Gann this week.  He should call Dr. Gann if they have not called him by Tuesday.  The patient will be sent home with a urine strainer and a specimen cup.  If he passes a stone he should place it in the specimen cup and take it to the urologist office.  Patient should return to emergency department there is pain not controlled with the Norco, vomiting not controlled with the Zofran, fever, worse in any way at all.  All the patient's questions were answered the patient will be discharged in good condition.    1:05 PM, 09/23/18:  The [facility] report was reviewed by me.  The report number is [id number].        [facility]    No orders to display     Labs Reviewed - No data to display  No results found.    Final diagnoses:   Left ureteral stone         ED Medications:  Medications - No data to display    New Medications:     Medication List      ASK your doctor about these medications    HYDROcodone-acetaminophen 5-325 MG per tablet  Commonly known as:  NORCO  Take 1 tablet by mouth Every 4 (Four) Hours As Needed for Severe Pain .     ondansetron 8 MG tablet  Commonly known as:  ZOFRAN  Take 1 tablet by mouth Every 8 (Eight) Hours As Needed for Nausea or   Vomiting.     promethazine 25 MG tablet  Commonly known as:  PHENERGAN  Take 1 tablet by mouth Every 6 (Six) Hours As Needed for Nausea or   Vomiting.          Stopped Medications:      Medication List      ASK your doctor about these medications    HYDROcodone-acetaminophen 5-325 MG per tablet  Commonly known as:  NORCO  Take 1 tablet by mouth Every 4 (Four) Hours As Needed for Severe Pain .     ondansetron 8 MG tablet  Commonly known as:  ZOFRAN  Take 1 tablet by mouth Every 8 (Eight) Hours As Needed for Nausea or   Vomiting.     promethazine 25 MG tablet  Commonly known as:  PHENERGAN  Take 1 tablet by mouth Every 6 (Six) Hours As Needed for Nausea or   Vomiting.            Final diagnoses:   Left ureteral stone            Casimiro Powell MD  09/23/18 2395       Casimiro Powell MD  09/23/18 2663

## 2018-09-23 NOTE — ED NOTES
informed of pt's low blood pressure,pt sitting up in bed without s/sx's of distress talking with visitor. PO Fld challenge given d/t recent dc of IVF site. Continue to monitor.     Shlomo Handy, UYEN  09/23/18 5821

## 2018-09-23 NOTE — DISCHARGE INSTRUCTIONS
Strain your urine.  If he passed a stone, place and a specimen cup and take it to the urologist office.  Follow-up with Dr. Gann this week.  If his office has not called to buy Tuesday, call them to make the appointment.  Return to emergency department there is pain not controlled with the Norco, vomiting not controlled with Zofran, fever, chills, worse in anyway at all.

## 2018-09-25 LAB — BACTERIA SPEC AEROBE CULT: NO GROWTH

## 2018-10-04 ENCOUNTER — HOSPITAL ENCOUNTER (EMERGENCY)
Facility: HOSPITAL | Age: 23
Discharge: HOME | End: 2018-10-04
Attending: EMERGENCY MEDICINE | Admitting: EMERGENCY MEDICINE

## 2018-10-04 ENCOUNTER — APPOINTMENT (OUTPATIENT)
Dept: GENERAL RADIOLOGY | Facility: HOSPITAL | Age: 23
End: 2018-10-04

## 2018-10-04 VITALS
RESPIRATION RATE: 16 BRPM | BODY MASS INDEX: 20.04 KG/M2 | DIASTOLIC BLOOD PRESSURE: 87 MMHG | HEIGHT: 70 IN | WEIGHT: 140 LBS | TEMPERATURE: 98.6 F | OXYGEN SATURATION: 97 % | SYSTOLIC BLOOD PRESSURE: 126 MMHG | HEART RATE: 102 BPM

## 2018-10-04 DIAGNOSIS — N23 RENAL COLIC: Primary | ICD-10-CM

## 2018-10-04 DIAGNOSIS — R31.9 HEMATURIA, UNSPECIFIED TYPE: ICD-10-CM

## 2018-10-04 LAB
ALBUMIN SERPL-MCNC: 4.4 G/DL (ref 3.5–5.2)
ALBUMIN/GLOB SERPL: 2.1 G/DL
ALP SERPL-CCNC: 82 U/L (ref 40–129)
ALT SERPL W P-5'-P-CCNC: 9 U/L (ref 5–41)
ANION GAP SERPL CALCULATED.3IONS-SCNC: 11.8 MMOL/L
AST SERPL-CCNC: 14 U/L (ref 5–40)
BACTERIA UR QL AUTO: ABNORMAL /HPF
BASOPHILS # BLD AUTO: 0.08 10*3/MM3 (ref 0–0.2)
BASOPHILS NFR BLD AUTO: 0.6 % (ref 0–2)
BILIRUB SERPL-MCNC: 0.4 MG/DL (ref 0.2–1.2)
BILIRUB UR QL STRIP: NEGATIVE
BUN BLD-MCNC: 12 MG/DL (ref 6–20)
BUN/CREAT SERPL: 15.2 (ref 7–25)
CALCIUM SPEC-SCNC: 8.7 MG/DL (ref 8.6–10.5)
CHLORIDE SERPL-SCNC: 103 MMOL/L (ref 98–107)
CLARITY UR: ABNORMAL
CO2 SERPL-SCNC: 24.2 MMOL/L (ref 22–29)
COLOR UR: YELLOW
CREAT BLD-MCNC: 0.79 MG/DL (ref 0.76–1.27)
DEPRECATED RDW RBC AUTO: 46.3 FL (ref 37–54)
EOSINOPHIL # BLD AUTO: 0.13 10*3/MM3 (ref 0.1–0.3)
EOSINOPHIL NFR BLD AUTO: 0.9 % (ref 0–4)
ERYTHROCYTE [DISTWIDTH] IN BLOOD BY AUTOMATED COUNT: 13.3 % (ref 11.5–14.5)
GFR SERPL CREATININE-BSD FRML MDRD: 120 ML/MIN/1.73
GLOBULIN UR ELPH-MCNC: 2.1 GM/DL
GLUCOSE BLD-MCNC: 95 MG/DL (ref 65–99)
GLUCOSE UR STRIP-MCNC: NEGATIVE MG/DL
HCT VFR BLD AUTO: 40.6 % (ref 42–52)
HGB BLD-MCNC: 13.5 G/DL (ref 14–18)
HGB UR QL STRIP.AUTO: ABNORMAL
HYALINE CASTS UR QL AUTO: ABNORMAL /LPF
IMM GRANULOCYTES # BLD: 0.05 10*3/MM3 (ref 0–0.03)
IMM GRANULOCYTES NFR BLD: 0.3 % (ref 0–0.5)
KETONES UR QL STRIP: NEGATIVE
LEUKOCYTE ESTERASE UR QL STRIP.AUTO: NEGATIVE
LYMPHOCYTES # BLD AUTO: 3.85 10*3/MM3 (ref 0.6–4.8)
LYMPHOCYTES NFR BLD AUTO: 26.6 % (ref 20–45)
MCH RBC QN AUTO: 30.8 PG (ref 27–31)
MCHC RBC AUTO-ENTMCNC: 33.3 G/DL (ref 31–37)
MCV RBC AUTO: 92.7 FL (ref 80–94)
MONOCYTES # BLD AUTO: 0.99 10*3/MM3 (ref 0–1)
MONOCYTES NFR BLD AUTO: 6.8 % (ref 3–8)
NEUTROPHILS # BLD AUTO: 9.39 10*3/MM3 (ref 1.5–8.3)
NEUTROPHILS NFR BLD AUTO: 64.8 % (ref 45–70)
NITRITE UR QL STRIP: NEGATIVE
NRBC BLD MANUAL-RTO: 0 /100 WBC (ref 0–0)
PH UR STRIP.AUTO: 8.5 [PH] (ref 4.5–8)
PLATELET # BLD AUTO: 442 10*3/MM3 (ref 140–500)
PMV BLD AUTO: 9.5 FL (ref 7.4–10.4)
POTASSIUM BLD-SCNC: 3.9 MMOL/L (ref 3.5–5.2)
PROT SERPL-MCNC: 6.5 G/DL (ref 6–8.5)
PROT UR QL STRIP: NEGATIVE
RBC # BLD AUTO: 4.38 10*6/MM3 (ref 4.7–6.1)
RBC # UR: ABNORMAL /HPF
REF LAB TEST METHOD: ABNORMAL
SODIUM BLD-SCNC: 139 MMOL/L (ref 136–145)
SP GR UR STRIP: 1.01 (ref 1–1.03)
SQUAMOUS #/AREA URNS HPF: ABNORMAL /HPF
UROBILINOGEN UR QL STRIP: ABNORMAL
WBC NRBC COR # BLD: 14.49 10*3/MM3 (ref 4.8–10.8)
WBC UR QL AUTO: ABNORMAL /HPF

## 2018-10-04 PROCEDURE — 96361 HYDRATE IV INFUSION ADD-ON: CPT

## 2018-10-04 PROCEDURE — 80053 COMPREHEN METABOLIC PANEL: CPT | Performed by: PHYSICIAN ASSISTANT

## 2018-10-04 PROCEDURE — 96374 THER/PROPH/DIAG INJ IV PUSH: CPT

## 2018-10-04 PROCEDURE — 81001 URINALYSIS AUTO W/SCOPE: CPT | Performed by: PHYSICIAN ASSISTANT

## 2018-10-04 PROCEDURE — 74018 RADEX ABDOMEN 1 VIEW: CPT

## 2018-10-04 PROCEDURE — 99284 EMERGENCY DEPT VISIT MOD MDM: CPT | Performed by: PHYSICIAN ASSISTANT

## 2018-10-04 PROCEDURE — 85025 COMPLETE CBC W/AUTO DIFF WBC: CPT | Performed by: PHYSICIAN ASSISTANT

## 2018-10-04 PROCEDURE — 96375 TX/PRO/DX INJ NEW DRUG ADDON: CPT

## 2018-10-04 PROCEDURE — 25010000002 KETOROLAC TROMETHAMINE PER 15 MG: Performed by: PHYSICIAN ASSISTANT

## 2018-10-04 PROCEDURE — 25010000002 ONDANSETRON PER 1 MG: Performed by: PHYSICIAN ASSISTANT

## 2018-10-04 PROCEDURE — 99283 EMERGENCY DEPT VISIT LOW MDM: CPT

## 2018-10-04 RX ORDER — SODIUM CHLORIDE 0.9 % (FLUSH) 0.9 %
10 SYRINGE (ML) INJECTION AS NEEDED
Status: DISCONTINUED | OUTPATIENT
Start: 2018-10-04 | End: 2018-10-04 | Stop reason: HOSPADM

## 2018-10-04 RX ORDER — HYDROCODONE BITARTRATE AND ACETAMINOPHEN 5; 325 MG/1; MG/1
1 TABLET ORAL ONCE
Status: COMPLETED | OUTPATIENT
Start: 2018-10-04 | End: 2018-10-04

## 2018-10-04 RX ORDER — KETOROLAC TROMETHAMINE 30 MG/ML
30 INJECTION, SOLUTION INTRAMUSCULAR; INTRAVENOUS ONCE
Status: COMPLETED | OUTPATIENT
Start: 2018-10-04 | End: 2018-10-04

## 2018-10-04 RX ORDER — ONDANSETRON 2 MG/ML
4 INJECTION INTRAMUSCULAR; INTRAVENOUS ONCE
Status: COMPLETED | OUTPATIENT
Start: 2018-10-04 | End: 2018-10-04

## 2018-10-04 RX ORDER — NAPROXEN 500 MG/1
500 TABLET ORAL 2 TIMES DAILY PRN
Qty: 20 TABLET | Refills: 0 | OUTPATIENT
Start: 2018-10-04 | End: 2019-01-04

## 2018-10-04 RX ADMIN — KETOROLAC TROMETHAMINE 30 MG: 30 INJECTION, SOLUTION INTRAMUSCULAR at 14:17

## 2018-10-04 RX ADMIN — HYDROCODONE BITARTRATE AND ACETAMINOPHEN 1 TABLET: 5; 325 TABLET ORAL at 15:41

## 2018-10-04 RX ADMIN — SODIUM CHLORIDE 1000 ML: 9 INJECTION, SOLUTION INTRAVENOUS at 14:17

## 2018-10-04 RX ADMIN — ONDANSETRON 4 MG: 2 INJECTION, SOLUTION INTRAMUSCULAR; INTRAVENOUS at 14:17

## 2018-10-04 NOTE — ED PROVIDER NOTES
Subjective   History of Present Illness  History of Present Illness    Chief complaint: flank pain    Location: L flank    Quality/Severity:  Sharp, severe    Timing/Duration: 10 days, worsening    Modifying Factors: Nothing specific makes worse or better    Associated Symptoms: Positive nausea and vomiting times one this morning.  Denies hematuria.  Denies dysuria.  Denies frequency or urgency.  Denies chest pain or shortness of breath.  Denies fevers or chills.    Narrative: 23-year-old male with history of kidneys stones presents with similar symptoms.  He was seen here on 9/23 and was diagnosed with a 3 mm left proximal ureteral stone.  Patient has not been straining his urine, but he does not think it has passed.  He has not seen urology yet.  He called their office this morning to see if he could get in and they told him to come to the ER.    Review of Systems  General: Denies fevers or chills.  Denies any weakness or fatigue.  Denies any weight loss or weight gain.  SKIN: Denies any rashes lesions or ulcers.  Denies color change.  ENT: Denies sore throat or rhinorrhea.  Denies ear pain.    EYES: Denies any blurred vision.  Denies any change in vision.  Denies any photophobia.  Denies any vision loss.  LUNGS: Denies any shortness of breath or wheezing.  Denies any cough.  Denies any hemoptysis.  CARDIAC: Denies any chest pain.  Denies palpitations.  Denies syncope.  Denies any edema  ABD:+n/v.  Denies any abdominal pain.  Denies diarrhea.  Denies any rectal bleeding.  Denies constipation  : Denies any dysuria, urgency, frequency or hematuria.  Denies discharge.  + flank pain.  NEURO: Denies any focal weakness.  Denies headache.  Denies seizures.  Denies changes in speech or difficulty walking.  ENDOCRINE: Denies polydipsia and polyuria  M/S: Denies arthralgias, back pain, myalgias or neck pain  HEME/LYMPH: Negative for adenopathy. Does not bruise/bleed easily.   PSYCH: Negative for suicidal ideas. Denies  anxiety or depression  review was performed in addition to those in the above all other reviews are negative.      Past Medical History:   Diagnosis Date   • ADD (attention deficit disorder)    • Anxiety    • Arthritis    • Chronic pain    • Headache    • Lumbar back pain    • Tobacco abuse counseling        Allergies   Allergen Reactions   • Amoxicillin    • Amoxicillin-Pot Clavulanate    • Cefaclor    • Levofloxacin Itching and Rash   • Penicillins Rash       Past Surgical History:   Procedure Laterality Date   • FEMUR FRACTURE SURGERY  06/2009   • HIP SURGERY Left 10/2014   • HIP SURGERY Left 06/2009       Family History   Problem Relation Age of Onset   • Diabetes Mother    • Fibromyalgia Mother    • Heart disease Mother    • Hypertension Mother    • Cervical cancer Mother    • Hypertension Father    • No Known Problems Sister    • No Known Problems Brother    • No Known Problems Sister    • No Known Problems Sister    • No Known Problems Sister        Social History     Social History   • Marital status: Single     Occupational History   • Student    •       Social History Main Topics   • Smoking status: Current Every Day Smoker     Packs/day: 1.00     Years: 8.00     Types: Cigarettes   • Smokeless tobacco: Never Used      Comment: Patient states he would love to quit.   • Alcohol use Yes      Comment: Social   • Drug use: No   • Sexual activity: Defer     Other Topics Concern   • Not on file     No current facility-administered medications for this encounter.     Current Outpatient Prescriptions:   •  Amphetamine-Dextroamphetamine (ADDERALL PO), Take 1 tablet/day by mouth. ? mg, Disp: , Rfl:   •  HYDROcodone-acetaminophen (NORCO) 5-325 MG per tablet, 1-2 po every 4 hours as needed for pain, Disp: 30 tablet, Rfl: 0  •  nitrofurantoin, macrocrystal-monohydrate, (MACROBID) 100 MG capsule, Take 1 capsule by mouth 2 (Two) Times a Day., Disp: 14 capsule, Rfl: 0  •  ondansetron ODT (ZOFRAN ODT) 8 MG  disintegrating tablet, Take 1 tablet by mouth Every 8 (Eight) Hours As Needed for Nausea or Vomiting., Disp: 10 tablet, Rfl: 0        Objective   Physical Exam  Vitals:    10/04/18 1307   BP: 126/87   Pulse: 102   Resp: 16   Temp: 98.6 °F (37 °C)   SpO2: 97%       GENERAL: Alert and oriented ×4.  No apparent distress.  SKIN: Warm, pink and dry  HEENT: Atraumatic normocephalic  LUNGS: Clear to auscultation bilaterally without wheezes, rales or rhonchi  CARDIAC: Regular rate and rhythm.  S1 and S2.  No murmurs, rubs or gallops.  ABD: Soft, nontender, nondistended.  Bowel sounds are present and normoactive.  No guarding or rebound tenderness.  Positive left CVA tenderness  M/S: MAEW, no deformity  PSYCH: Normal mood and affect    Procedures           ED Course  ED Course as of Oct 04 1414   Thu Oct 04, 2018   1413 Down from 19. WBC: (!) 14.49 [KY]      ED Course User Index  [KY] January Plaza, ENOC    toradol given    Results for orders placed or performed during the hospital encounter of 10/04/18   Comprehensive Metabolic Panel   Result Value Ref Range    Glucose 95 65 - 99 mg/dL    BUN 12 6 - 20 mg/dL    Creatinine 0.79 0.76 - 1.27 mg/dL    Sodium 139 136 - 145 mmol/L    Potassium 3.9 3.5 - 5.2 mmol/L    Chloride 103 98 - 107 mmol/L    CO2 24.2 22.0 - 29.0 mmol/L    Calcium 8.7 8.6 - 10.5 mg/dL    Total Protein 6.5 6.0 - 8.5 g/dL    Albumin 4.40 3.50 - 5.20 g/dL    ALT (SGPT) 9 5 - 41 U/L    AST (SGOT) 14 5 - 40 U/L    Alkaline Phosphatase 82 40 - 129 U/L    Total Bilirubin 0.4 0.2 - 1.2 mg/dL    eGFR Non African Amer 120 >60 mL/min/1.73    Globulin 2.1 gm/dL    A/G Ratio 2.1 g/dL    BUN/Creatinine Ratio 15.2 7.0 - 25.0    Anion Gap 11.8 mmol/L   Urinalysis With Microscopic If Indicated (No Culture) - Urine, Clean Catch   Result Value Ref Range    Color, UA Yellow Yellow, Straw    Appearance, UA Slightly Cloudy (A) Clear    pH, UA 8.5 (H) 4.5 - 8.0    Specific Gravity, UA 1.015 1.003 - 1.030    Glucose, UA  Negative Negative    Ketones, UA Negative Negative, 80 mg/dL (3+), >=160 mg/dL (4+)    Bilirubin, UA Negative Negative    Blood, UA Large (3+) (A) Negative    Protein, UA Negative Negative    Leuk Esterase, UA Negative Negative    Nitrite, UA Negative Negative    Urobilinogen, UA 0.2 E.U./dL 0.2 - 1.0 E.U./dL   CBC Auto Differential   Result Value Ref Range    WBC 14.49 (H) 4.80 - 10.80 10*3/mm3    RBC 4.38 (L) 4.70 - 6.10 10*6/mm3    Hemoglobin 13.5 (L) 14.0 - 18.0 g/dL    Hematocrit 40.6 (L) 42.0 - 52.0 %    MCV 92.7 80.0 - 94.0 fL    MCH 30.8 27.0 - 31.0 pg    MCHC 33.3 31.0 - 37.0 g/dL    RDW 13.3 11.5 - 14.5 %    RDW-SD 46.3 37.0 - 54.0 fl    MPV 9.5 7.4 - 10.4 fL    Platelets 442 140 - 500 10*3/mm3    Neutrophil % 64.8 45.0 - 70.0 %    Lymphocyte % 26.6 20.0 - 45.0 %    Monocyte % 6.8 3.0 - 8.0 %    Eosinophil % 0.9 0.0 - 4.0 %    Basophil % 0.6 0.0 - 2.0 %    Immature Grans % 0.3 0.0 - 0.5 %    Neutrophils, Absolute 9.39 (H) 1.50 - 8.30 10*3/mm3    Lymphocytes, Absolute 3.85 0.60 - 4.80 10*3/mm3    Monocytes, Absolute 0.99 0.00 - 1.00 10*3/mm3    Eosinophils, Absolute 0.13 0.10 - 0.30 10*3/mm3    Basophils, Absolute 0.08 0.00 - 0.20 10*3/mm3    Immature Grans, Absolute 0.05 (H) 0.00 - 0.03 10*3/mm3    nRBC 0.0 0.0 - 0.0 /100 WBC   Urinalysis, Microscopic Only - Urine, Clean Catch   Result Value Ref Range    RBC, UA Too Numerous to Count (A) None Seen /HPF    WBC, UA None Seen None Seen /HPF    Bacteria, UA None Seen None Seen /HPF    Squamous Epithelial Cells, UA 0-2 None Seen, 0-2 /HPF    Hyaline Casts, UA None Seen None Seen /LPF    Methodology Manual Light Microscopy      Reviewed KUB. Independently viewed by me. Interpreted by radiologist. Discussed with pt and mother.  NEGATIVE.  No radiopaque renal or urinary tract calculi visible.    1515- improved.  Discussed results.  F/u urology (called office, pt has cystoscopy 10/19 and will be seen tomorrow at 9:30.       Discussed pertinent labs and imaging  findings with the patient/family.  Patient/Family voiced understanding of need to follow-up for recheck, further testing as needed.  Return to the emergency Department warnings were given.  D/c naproxen.        MDM  Number of Diagnoses or Management Options  Hematuria, unspecified type: established and worsening  Renal colic: established and worsening     Amount and/or Complexity of Data Reviewed  Clinical lab tests: reviewed and ordered  Tests in the radiology section of CPT®: reviewed and ordered  Tests in the medicine section of CPT®: reviewed and ordered  Decide to obtain previous medical records or to obtain history from someone other than the patient: yes  Obtain history from someone other than the patient: yes  Review and summarize past medical records: yes  Independent visualization of images, tracings, or specimens: yes    Risk of Complications, Morbidity, and/or Mortality  Presenting problems: moderate  Diagnostic procedures: moderate  Management options: moderate    Patient Progress  Patient progress: improved        Final diagnoses:   Renal colic   Hematuria, unspecified type     Dictated utilizing Dragon dictation         January Plaza PA-C  10/04/18 1524

## 2018-10-04 NOTE — ED NOTES
[facility] [phone]    Got appointment for tomorrow 10/5/18 at 9.30am with Chandra.      Henry Arriola  10/04/18 1523       Henry Arriola  10/04/18 1521

## 2019-01-04 ENCOUNTER — APPOINTMENT (OUTPATIENT)
Dept: GENERAL RADIOLOGY | Facility: HOSPITAL | Age: 24
End: 2019-01-04

## 2019-01-04 PROCEDURE — 71046 X-RAY EXAM CHEST 2 VIEWS: CPT | Performed by: EMERGENCY MEDICINE

## 2024-01-03 ENCOUNTER — LAB REQUISITION (OUTPATIENT)
Dept: LAB | Facility: HOSPITAL | Age: 29
End: 2024-01-03
Payer: COMMERCIAL

## 2024-01-03 DIAGNOSIS — N20.2 CALCULUS OF KIDNEY WITH CALCULUS OF URETER: ICD-10-CM

## 2024-01-03 PROCEDURE — 82365 CALCULUS SPECTROSCOPY: CPT | Performed by: UROLOGY

## 2024-01-10 LAB
CALCIUM OXALATE DIHYDRATE MFR STONE IR: 100 %
COLOR STONE: NORMAL
COMPN STONE: NORMAL
LABORATORY COMMENT REPORT: NORMAL
LABORATORY COMMENT REPORT: NORMAL
Lab: NORMAL
Lab: NORMAL
PHOTO: NORMAL
SIZE STONE: NORMAL MM
SPEC SOURCE SUBJ: NORMAL
WT STONE: 25 MG

## 2025-05-26 ENCOUNTER — READMISSION MANAGEMENT (OUTPATIENT)
Dept: CALL CENTER | Facility: HOSPITAL | Age: 30
End: 2025-05-26
Payer: COMMERCIAL

## 2025-05-26 NOTE — OUTREACH NOTE
Prep Survey      Flowsheet Row Responses   [facility] facility patient discharged from? Non-BH   Is LACE score < 7 ? Non- Discharge   Eligibility [facility]   Date of Admission 05/24/25   Date of Discharge 05/26/25   Discharge Disposition Home or Self Care   Discharge diagnosis Acute nonintractable headache, unspecified headache type (Primary Dx),  Metabolic encephalopathy,  Leukemoid reaction,  Other iron deficiency anemia,  Suspected sepsis,  Acute non intractable tension-type headache,  Anxiety,  Adult attention deficit hyperactivity disorder,  Lactic acidosis,  Elevated CK,  Metabolic acidosis, increased anion gap,  Alcoholic intoxication without complication,  Acute metabolic encephalopathy,  Acalculous cholecystitis,  RUQ pain,  Hidradenitis suppurativa,  Chronic left hip pain,  Drug usage,  Complications due to internal orthopedic device, implant, and graft   Does the patient have one of the following disease processes/diagnoses(primary or secondary)? Other   Does the patient have Home health ordered? No   Prep survey completed? Yes            Eulalia AREVALO - Registered Nurse              Eulalia AREVALO - Erin Dow

## 2025-05-27 ENCOUNTER — TRANSITIONAL CARE MANAGEMENT TELEPHONE ENCOUNTER (OUTPATIENT)
Dept: CALL CENTER | Facility: HOSPITAL | Age: 30
End: 2025-05-27
Payer: COMMERCIAL

## 2025-05-27 NOTE — OUTREACH NOTE
Call Center TCM Note      Flowsheet Row Responses   [facility] patient discharged from? Non-  [Kemar Coleman]   Does the patient have one of the following disease processes/diagnoses(primary or secondary)? Other   TCM attempt successful? No   Unsuccessful attempts Attempt 1   Revoked Reason Other  [Patient has not seen PCP since 2018]            Carly Grigsby Registered Nurse    5/27/2025, 09:48 EDT